# Patient Record
Sex: FEMALE | Race: AMERICAN INDIAN OR ALASKA NATIVE | NOT HISPANIC OR LATINO | Employment: FULL TIME | URBAN - METROPOLITAN AREA
[De-identification: names, ages, dates, MRNs, and addresses within clinical notes are randomized per-mention and may not be internally consistent; named-entity substitution may affect disease eponyms.]

---

## 2023-09-12 ENCOUNTER — MEDICAL CORRESPONDENCE (OUTPATIENT)
Dept: HEALTH INFORMATION MANAGEMENT | Facility: CLINIC | Age: 38
End: 2023-09-12

## 2023-09-12 ENCOUNTER — HOSPITAL ENCOUNTER (EMERGENCY)
Facility: CLINIC | Age: 38
Discharge: HOME | End: 2023-09-12
Attending: STUDENT IN AN ORGANIZED HEALTH CARE EDUCATION/TRAINING PROGRAM | Admitting: STUDENT IN AN ORGANIZED HEALTH CARE EDUCATION/TRAINING PROGRAM
Payer: COMMERCIAL

## 2023-09-12 VITALS
WEIGHT: 139.33 LBS | HEART RATE: 88 BPM | RESPIRATION RATE: 20 BRPM | OXYGEN SATURATION: 100 % | DIASTOLIC BLOOD PRESSURE: 79 MMHG | SYSTOLIC BLOOD PRESSURE: 122 MMHG

## 2023-09-12 DIAGNOSIS — S51.852A DOG BITE OF LEFT FOREARM, INITIAL ENCOUNTER: ICD-10-CM
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
## 2023-09-12 PROBLEM — Z23 RABIES, NEED FOR PROPHYLACTIC VACCINATION AGAINST: Status: ACTIVE | Noted: 2023-09-12

## 2023-09-12 PROCEDURE — 250N000011 HC RX IP 250 OP 636: Performed by: EMERGENCY MEDICINE

## 2023-09-12 PROCEDURE — 90471 IMMUNIZATION ADMIN: CPT | Performed by: EMERGENCY MEDICINE

## 2023-09-12 PROCEDURE — 90675 RABIES VACCINE IM: CPT | Performed by: EMERGENCY MEDICINE

## 2023-09-12 PROCEDURE — 90375 RABIES IG IM/SC: CPT | Mod: JZ | Performed by: EMERGENCY MEDICINE

## 2023-09-12 PROCEDURE — 99284 EMERGENCY DEPT VISIT MOD MDM: CPT | Mod: 25

## 2023-09-12 RX ADMIN — RABIES VIRUS STRAIN PM-1503-3M ANTIGEN (PROPIOLACTONE INACTIVATED) AND WATER 1 ML: KIT at 12:11

## 2023-09-12 RX ADMIN — RABIES IMMUNE GLOBULIN (HUMAN) 1260 UNITS: 300 INJECTION, SOLUTION INFILTRATION; INTRAMUSCULAR at 12:12

## 2023-09-12 NOTE — ED PROVIDER NOTES
History     Chief Complaint:  Dog Bite       HPI   Huyen Burgos is a 38 year old female who presents from urgent care after wound care and tetanus shot with antibiotic prescription for Augmentin to obtain rabies immunoglobulin and vaccine.  She notes last night she tried to intervene in a dog fight between her dog and a stray dog.  She notes the stray dog bit her in the left forearm.  She notes she does not know the dog and did not get custody of the dog.  She notes some soreness over the left forearm.  She went to urgent care and they bandaged and cleaned the wound as well as gave her a prescription for Augmentin and a tetanus shot.  She was sent here for rabies immunoglobulin and vaccine.  She denies numbness or tingling.  She denies any problems with movement of the left arm aside from the generalized soreness where the dog bite is located.  She incidentally notes she is on Keflex for a UTI.      Independent Historian:   None - Patient Only    Review of External Notes:   I reviewed the patient's chart.  I did not see a note regarding today's evaluation.    Medications:    Birth control  Antidepressant  Keflex    Past Medical History:    Past Medical History:   Diagnosis Date    History of colposcopy with cervical biopsy 5/12/11    HSIL on Pap smear 5/3/11         Physical Exam   Patient Vitals for the past 24 hrs:   BP Pulse Resp SpO2 Weight   09/12/23 0946 122/79 88 20 100 % --   09/12/23 0943 -- -- -- -- 63.2 kg (139 lb 5.3 oz)        Physical Exam  General: Well female sitting upright  CV: Left radial pulse palpable.  MSK: Tender at the area of the puncture wound over the left volar forearm, proximal.  No palpable foreign body.  Normal active range of motion of the left upper extremity.  Skin: Warm and dry.  Puncture wound noted over the volar surface of the left upper extremity, approximately.  No surrounding erythema.  Small amount of visible cutaneous fat.  No visible muscle.  Neuro: Alert and  oriented. Responds appropriately to all questions and commands.  Station intact to light touch over all dermatomes of the distal left upper extremity.  5 out of 5 finger abduction, thumb opposition and wrist extension left upper extremity.  Psych: Normal mood and affect. Pleasant.     Emergency Department Course       Emergency Department Course & Assessments:      Interventions:  Medications   rabies immune globulin 300 units/mL (HYPERAB) injection 1,260 Units (1,260 Units Infiltration $Given by Other Clinician 9/12/23 1212)   rabies vaccine,human diploid (IMOVAX) vaccine 1 mL (1 mL Intramuscular $Given 9/12/23 1211)        Assessments:  I performed an exam of the patient and obtained history, as documented above.  I performed a subcutaneous injection of rabies IG at the site of the wound.   Patient reassessed. No new concerns.     Independent Interpretation (X-rays, CTs, rhythm strip):  None    Consultations/Discussion of Management or Tests:  None     Social Determinants of Health affecting care:   None    Disposition:  The patient was discharged to home.     Impression & Plan        Medical Decision Making:  Huyen Burgos is a 38 year old female who presents for evaluation of a dog bite to the left forearm. She had wound care, tetanus and abx at outpatient facility. The workup here in the ED shows no signs of compartment syndrome, significant lacerations, tendon or bone injury.  The puncture wound is left open to reduce the risk of development of infection. No signs of foreign body or dog teeth in wound.  As the dog is not known or captured, the patient was recommended rabies immunoglobulin and rabies vaccine series.  She is agreeable to this plan.  Initial doses were given in the ED with vaccine series appointment set up for her.  All questions were held prior to discharge.  Infection risk discussed, patient stands importance of return should she develop any of the symptoms.    Diagnosis:    ICD-10-CM    1.  Dog bite of left forearm, initial encounter  S51.852A     W54.0XXA             9/12/2023   Noy Pearson MD Jonkman, Tracy Dianne, MD  09/12/23 1449       Noy Pearson MD  09/12/23 4470

## 2023-09-12 NOTE — ED NOTES
Monitored patient after Rabies series- no adverse reactions. Patient stable and ready for discharge

## 2023-09-12 NOTE — ED TRIAGE NOTES
Pt was bitten by a stray dog last night Was seen in  this AM. Wound was cleaned and tetanus was administered. Pt was referred here for rabies shot. ABCs intact. CMS intact.      Triage Assessment       Row Name 09/12/23 0999       Triage Assessment (Adult)    Airway WDL WDL       Respiratory WDL    Respiratory WDL WDL       Peripheral/Neurovascular WDL    Peripheral Neurovascular WDL WDL

## 2023-09-15 ENCOUNTER — HOSPITAL ENCOUNTER (OUTPATIENT)
Dept: OUTPATIENT PROCEDURES | Facility: CLINIC | Age: 38
Discharge: HOME | End: 2023-09-15
Attending: EMERGENCY MEDICINE | Admitting: EMERGENCY MEDICINE
Payer: COMMERCIAL

## 2023-09-15 VITALS
RESPIRATION RATE: 16 BRPM | SYSTOLIC BLOOD PRESSURE: 138 MMHG | TEMPERATURE: 98.7 F | DIASTOLIC BLOOD PRESSURE: 89 MMHG | HEART RATE: 109 BPM | OXYGEN SATURATION: 100 %

## 2023-09-15 DIAGNOSIS — Z23 RABIES, NEED FOR PROPHYLACTIC VACCINATION AGAINST: Primary | ICD-10-CM

## 2023-09-15 PROCEDURE — 90471 IMMUNIZATION ADMIN: CPT | Performed by: EMERGENCY MEDICINE

## 2023-09-15 PROCEDURE — 90675 RABIES VACCINE IM: CPT | Performed by: EMERGENCY MEDICINE

## 2023-09-15 PROCEDURE — 250N000011 HC RX IP 250 OP 636: Performed by: EMERGENCY MEDICINE

## 2023-09-15 RX ADMIN — Medication 1 ML: at 14:09

## 2023-09-15 NOTE — PROGRESS NOTES
Outpatient Infusion Nursing Note    Huyen Burgos present today to [facility] for: rabies injection    Due to: Rabies, need for prophylactic vaccination against    Injection Note: Huyen tolerated rabies injection in L deltoid without difficulty or adverse effects. RN scheduled next 2 appointments in [facility].    Discharge Planning: Huyen verbalized understanding of discharge instructions. RN reviewed that pt should return to [facility] on 9/19 at 1:30pm for next injection. Pt left [facility] in stable condition.

## 2023-09-19 ENCOUNTER — HOSPITAL ENCOUNTER (OUTPATIENT)
Dept: OUTPATIENT PROCEDURES | Facility: CLINIC | Age: 38
Discharge: HOME | End: 2023-09-19
Attending: RADIOLOGY | Admitting: RADIOLOGY
Payer: COMMERCIAL

## 2023-09-19 VITALS
HEART RATE: 105 BPM | SYSTOLIC BLOOD PRESSURE: 113 MMHG | RESPIRATION RATE: 18 BRPM | OXYGEN SATURATION: 97 % | DIASTOLIC BLOOD PRESSURE: 78 MMHG

## 2023-09-19 DIAGNOSIS — Z23 RABIES, NEED FOR PROPHYLACTIC VACCINATION AGAINST: Primary | ICD-10-CM

## 2023-09-19 PROCEDURE — 90471 IMMUNIZATION ADMIN: CPT | Performed by: EMERGENCY MEDICINE

## 2023-09-19 PROCEDURE — 250N000011 HC RX IP 250 OP 636: Performed by: EMERGENCY MEDICINE

## 2023-09-19 PROCEDURE — 90675 RABIES VACCINE IM: CPT | Performed by: EMERGENCY MEDICINE

## 2023-09-19 RX ADMIN — RABIES VIRUS STRAIN PM-1503-3M ANTIGEN (PROPIOLACTONE INACTIVATED) AND WATER 1 ML: KIT at 13:53

## 2023-09-19 NOTE — PROGRESS NOTES
Outpatient Infusion Nursing Note    Huyen Burgos present today to [facility] for: Rabies Vaccine- Day 7    Due to: Rabies, need for prophylactic vaccination against    Intravenous Access/ Labs: NA    Coping:  Child Family Life: declined    Infusion Note: VSS. Rabies vaccine given into Right Deltoid without issue. Next appointment is scheduled for next Tuesday.     Discharge Planning: Patient verbalized understanding of discharge instructions.  RN reviewed that pt should return Next week for day 14 vaccine.  Pt left [facility] in stable condition.

## 2023-09-26 ENCOUNTER — HOSPITAL ENCOUNTER (OUTPATIENT)
Dept: OUTPATIENT PROCEDURES | Facility: CLINIC | Age: 38
Discharge: HOME | End: 2023-09-26
Attending: EMERGENCY MEDICINE | Admitting: EMERGENCY MEDICINE
Payer: COMMERCIAL

## 2023-09-26 VITALS
RESPIRATION RATE: 16 BRPM | OXYGEN SATURATION: 97 % | TEMPERATURE: 98.5 F | HEART RATE: 90 BPM | SYSTOLIC BLOOD PRESSURE: 116 MMHG | DIASTOLIC BLOOD PRESSURE: 86 MMHG

## 2023-09-26 DIAGNOSIS — Z23 RABIES, NEED FOR PROPHYLACTIC VACCINATION AGAINST: Primary | ICD-10-CM

## 2023-09-26 PROCEDURE — 90675 RABIES VACCINE IM: CPT | Performed by: EMERGENCY MEDICINE

## 2023-09-26 PROCEDURE — 90471 IMMUNIZATION ADMIN: CPT | Performed by: EMERGENCY MEDICINE

## 2023-09-26 PROCEDURE — 250N000011 HC RX IP 250 OP 636: Performed by: EMERGENCY MEDICINE

## 2023-09-26 RX ADMIN — Medication 1 ML: at 13:48

## 2023-09-26 ASSESSMENT — PAIN SCALES - GENERAL: PAINLEVEL: NO PAIN (1)

## 2023-09-26 NOTE — PROGRESS NOTES
Outpatient Infusion Nursing Note    [facility]    Due to: Rabies, need for prophylactic vaccination against    Intravenous Access/ Labs: N/A    Coping: N/A    Infusion Note: VSS. Pt noting hard bump/swelling that is painful below site of bite, starting in the past few days. No redness noted but bump is hard on palpation. Rabies vaccine administered in R Deltoid without issue. Final appointment.    Discharge Planning: Patient verbalized understanding of discharge instructions. RN reviewed that pt does not need to come in for any more vaccines, however encouraged pt to seek medical attention for hard bump near bite and to monitor for increased pain, redness, swelling. Pt left [facility] in stable condition.

## 2024-04-29 ENCOUNTER — HOSPITAL ENCOUNTER (INPATIENT)
Facility: CLINIC | Age: 39
LOS: 2 days | Discharge: HOME | DRG: 330 | End: 2024-05-02
Attending: STUDENT IN AN ORGANIZED HEALTH CARE EDUCATION/TRAINING PROGRAM | Admitting: SURGERY
Payer: COMMERCIAL

## 2024-04-29 ENCOUNTER — APPOINTMENT (OUTPATIENT)
Dept: CT IMAGING | Facility: CLINIC | Age: 39
DRG: 330 | End: 2024-04-29
Attending: STUDENT IN AN ORGANIZED HEALTH CARE EDUCATION/TRAINING PROGRAM
Payer: COMMERCIAL

## 2024-04-29 DIAGNOSIS — R11.2 NAUSEA AND VOMITING, UNSPECIFIED VOMITING TYPE: ICD-10-CM

## 2024-04-29 DIAGNOSIS — R10.84 GENERALIZED ABDOMINAL PAIN: ICD-10-CM

## 2024-04-29 DIAGNOSIS — Z98.890 S/P EXPLORATORY LAPAROTOMY: ICD-10-CM

## 2024-04-29 DIAGNOSIS — K56.2 CECAL VOLVULUS (H): Primary | ICD-10-CM

## 2024-04-29 PROBLEM — N94.6 DYSMENORRHEA: Status: ACTIVE | Noted: 2022-07-18

## 2024-04-29 PROBLEM — N63.13 MASS OF LOWER OUTER QUADRANT OF RIGHT BREAST: Status: ACTIVE | Noted: 2022-07-18

## 2024-04-29 LAB
ALBUMIN SERPL BCG-MCNC: 4.4 G/DL (ref 3.5–5.2)
ALP SERPL-CCNC: 76 U/L (ref 40–150)
ALT SERPL W P-5'-P-CCNC: 54 U/L (ref 0–50)
ANION GAP SERPL CALCULATED.3IONS-SCNC: 15 MMOL/L (ref 7–15)
AST SERPL W P-5'-P-CCNC: 46 U/L (ref 0–45)
BASOPHILS # BLD AUTO: 0.1 10E3/UL (ref 0–0.2)
BASOPHILS NFR BLD AUTO: 1 %
BILIRUB SERPL-MCNC: 1.3 MG/DL
BUN SERPL-MCNC: 12.6 MG/DL (ref 6–20)
CALCIUM SERPL-MCNC: 9.4 MG/DL (ref 8.6–10)
CHLORIDE SERPL-SCNC: 100 MMOL/L (ref 98–107)
CREAT SERPL-MCNC: 0.81 MG/DL (ref 0.51–0.95)
DEPRECATED HCO3 PLAS-SCNC: 22 MMOL/L (ref 22–29)
EGFRCR SERPLBLD CKD-EPI 2021: >90 ML/MIN/1.73M2
EOSINOPHIL # BLD AUTO: 0 10E3/UL (ref 0–0.7)
EOSINOPHIL NFR BLD AUTO: 0 %
ERYTHROCYTE [DISTWIDTH] IN BLOOD BY AUTOMATED COUNT: 12.3 % (ref 10–15)
GLUCOSE SERPL-MCNC: 137 MG/DL (ref 70–99)
HCG SERPL QL: NEGATIVE
HCT VFR BLD AUTO: 40.4 % (ref 35–47)
HGB BLD-MCNC: 13.4 G/DL (ref 11.7–15.7)
IMM GRANULOCYTES # BLD: 0.1 10E3/UL
IMM GRANULOCYTES NFR BLD: 1 %
LACTATE SERPL-SCNC: 0.7 MMOL/L (ref 0.7–2)
LIPASE SERPL-CCNC: 15 U/L (ref 13–60)
LYMPHOCYTES # BLD AUTO: 0.5 10E3/UL (ref 0.8–5.3)
LYMPHOCYTES NFR BLD AUTO: 5 %
MAGNESIUM SERPL-MCNC: 1.9 MG/DL (ref 1.7–2.3)
MCH RBC QN AUTO: 34.5 PG (ref 26.5–33)
MCHC RBC AUTO-ENTMCNC: 33.2 G/DL (ref 31.5–36.5)
MCV RBC AUTO: 104 FL (ref 78–100)
MONOCYTES # BLD AUTO: 0.4 10E3/UL (ref 0–1.3)
MONOCYTES NFR BLD AUTO: 4 %
NEUTROPHILS # BLD AUTO: 9.3 10E3/UL (ref 1.6–8.3)
NEUTROPHILS NFR BLD AUTO: 89 %
NRBC # BLD AUTO: 0 10E3/UL
NRBC BLD AUTO-RTO: 0 /100
PLATELET # BLD AUTO: 272 10E3/UL (ref 150–450)
POTASSIUM SERPL-SCNC: 3.8 MMOL/L (ref 3.4–5.3)
PROT SERPL-MCNC: 7.3 G/DL (ref 6.4–8.3)
RBC # BLD AUTO: 3.88 10E6/UL (ref 3.8–5.2)
SODIUM SERPL-SCNC: 137 MMOL/L (ref 135–145)
WBC # BLD AUTO: 10.3 10E3/UL (ref 4–11)

## 2024-04-29 PROCEDURE — 84703 CHORIONIC GONADOTROPIN ASSAY: CPT | Performed by: EMERGENCY MEDICINE

## 2024-04-29 PROCEDURE — 250N000011 HC RX IP 250 OP 636: Performed by: STUDENT IN AN ORGANIZED HEALTH CARE EDUCATION/TRAINING PROGRAM

## 2024-04-29 PROCEDURE — 258N000003 HC RX IP 258 OP 636: Performed by: STUDENT IN AN ORGANIZED HEALTH CARE EDUCATION/TRAINING PROGRAM

## 2024-04-29 PROCEDURE — 85025 COMPLETE CBC W/AUTO DIFF WBC: CPT | Performed by: EMERGENCY MEDICINE

## 2024-04-29 PROCEDURE — 36415 COLL VENOUS BLD VENIPUNCTURE: CPT | Performed by: EMERGENCY MEDICINE

## 2024-04-29 PROCEDURE — 83605 ASSAY OF LACTIC ACID: CPT | Performed by: EMERGENCY MEDICINE

## 2024-04-29 PROCEDURE — 83690 ASSAY OF LIPASE: CPT | Performed by: EMERGENCY MEDICINE

## 2024-04-29 PROCEDURE — 96374 THER/PROPH/DIAG INJ IV PUSH: CPT

## 2024-04-29 PROCEDURE — 80053 COMPREHEN METABOLIC PANEL: CPT | Performed by: EMERGENCY MEDICINE

## 2024-04-29 PROCEDURE — 74177 CT ABD & PELVIS W/CONTRAST: CPT

## 2024-04-29 PROCEDURE — 83735 ASSAY OF MAGNESIUM: CPT | Performed by: EMERGENCY MEDICINE

## 2024-04-29 PROCEDURE — 99285 EMERGENCY DEPT VISIT HI MDM: CPT | Mod: 25

## 2024-04-29 PROCEDURE — 96375 TX/PRO/DX INJ NEW DRUG ADDON: CPT

## 2024-04-29 PROCEDURE — 250N000009 HC RX 250: Performed by: STUDENT IN AN ORGANIZED HEALTH CARE EDUCATION/TRAINING PROGRAM

## 2024-04-29 RX ORDER — IOPAMIDOL 755 MG/ML
500 INJECTION, SOLUTION INTRAVASCULAR ONCE
Status: COMPLETED | OUTPATIENT
Start: 2024-04-29 | End: 2024-04-29

## 2024-04-29 RX ORDER — ONDANSETRON 2 MG/ML
4 INJECTION INTRAMUSCULAR; INTRAVENOUS EVERY 30 MIN PRN
Status: DISCONTINUED | OUTPATIENT
Start: 2024-04-29 | End: 2024-04-30

## 2024-04-29 RX ORDER — HYDROMORPHONE HYDROCHLORIDE 1 MG/ML
0.5 INJECTION, SOLUTION INTRAMUSCULAR; INTRAVENOUS; SUBCUTANEOUS EVERY 30 MIN PRN
Status: DISCONTINUED | OUTPATIENT
Start: 2024-04-29 | End: 2024-04-30

## 2024-04-29 RX ADMIN — SODIUM CHLORIDE 1000 ML: 9 INJECTION, SOLUTION INTRAVENOUS at 23:25

## 2024-04-29 RX ADMIN — SODIUM CHLORIDE 57 ML: 9 INJECTION, SOLUTION INTRAVENOUS at 23:33

## 2024-04-29 RX ADMIN — ONDANSETRON 4 MG: 2 INJECTION INTRAMUSCULAR; INTRAVENOUS at 23:19

## 2024-04-29 RX ADMIN — HYDROMORPHONE HYDROCHLORIDE 0.5 MG: 1 INJECTION, SOLUTION INTRAMUSCULAR; INTRAVENOUS; SUBCUTANEOUS at 23:21

## 2024-04-29 RX ADMIN — IOPAMIDOL 70 ML: 755 INJECTION, SOLUTION INTRAVENOUS at 23:33

## 2024-04-29 ASSESSMENT — COLUMBIA-SUICIDE SEVERITY RATING SCALE - C-SSRS
6. HAVE YOU EVER DONE ANYTHING, STARTED TO DO ANYTHING, OR PREPARED TO DO ANYTHING TO END YOUR LIFE?: NO
2. HAVE YOU ACTUALLY HAD ANY THOUGHTS OF KILLING YOURSELF IN THE PAST MONTH?: NO
1. IN THE PAST MONTH, HAVE YOU WISHED YOU WERE DEAD OR WISHED YOU COULD GO TO SLEEP AND NOT WAKE UP?: NO

## 2024-04-29 ASSESSMENT — ACTIVITIES OF DAILY LIVING (ADL)
ADLS_ACUITY_SCORE: 35

## 2024-04-29 NOTE — LETTER
[facility]  [address]  [facility] [address]  Phone: [phone]      May 2, 2024        Huyen Burgos  [address] [facility] [address]          To whom it may concern:    RE: Huyen Burgos    Patient was seen and treated at [facility] from 4/29-5/2. Due to medical reasons it is recommended she be off work through 5/12. She has a follow up appointment on 5/9 at which time she will be reassessed and changes to this or accommodations may be requested.     Please contact me for questions or concerns.      Sincerely,      Evans Gonzales MD  [facility]  [phone]

## 2024-04-30 ENCOUNTER — ANESTHESIA (OUTPATIENT)
Dept: SURGERY | Facility: CLINIC | Age: 39
DRG: 330 | End: 2024-04-30
Payer: COMMERCIAL

## 2024-04-30 ENCOUNTER — ANESTHESIA EVENT (OUTPATIENT)
Dept: SURGERY | Facility: CLINIC | Age: 39
DRG: 330 | End: 2024-04-30
Payer: COMMERCIAL

## 2024-04-30 PROBLEM — R11.2 NAUSEA AND VOMITING, UNSPECIFIED VOMITING TYPE: Status: ACTIVE | Noted: 2024-04-30

## 2024-04-30 PROBLEM — K56.2 CECAL VOLVULUS (H): Status: ACTIVE | Noted: 2024-04-30

## 2024-04-30 PROBLEM — R10.84 GENERALIZED ABDOMINAL PAIN: Status: ACTIVE | Noted: 2024-04-30

## 2024-04-30 LAB
CREAT SERPL-MCNC: 0.69 MG/DL (ref 0.51–0.95)
EGFRCR SERPLBLD CKD-EPI 2021: >90 ML/MIN/1.73M2
GLUCOSE BLDC GLUCOMTR-MCNC: 147 MG/DL (ref 70–99)
RADIOLOGIST FLAGS: ABNORMAL

## 2024-04-30 PROCEDURE — 250N000009 HC RX 250: Performed by: NURSE ANESTHETIST, CERTIFIED REGISTERED

## 2024-04-30 PROCEDURE — 250N000011 HC RX IP 250 OP 636: Performed by: ANESTHESIOLOGY

## 2024-04-30 PROCEDURE — 250N000011 HC RX IP 250 OP 636: Performed by: SURGERY

## 2024-04-30 PROCEDURE — 999N000141 HC STATISTIC PRE-PROCEDURE NURSING ASSESSMENT: Performed by: SURGERY

## 2024-04-30 PROCEDURE — 360N000077 HC SURGERY LEVEL 4, PER MIN: Performed by: SURGERY

## 2024-04-30 PROCEDURE — 120N000001 HC R&B MED SURG/OB

## 2024-04-30 PROCEDURE — 250N000011 HC RX IP 250 OP 636: Performed by: PHYSICIAN ASSISTANT

## 2024-04-30 PROCEDURE — 370N000017 HC ANESTHESIA TECHNICAL FEE, PER MIN: Performed by: SURGERY

## 2024-04-30 PROCEDURE — 258N000003 HC RX IP 258 OP 636: Performed by: SURGERY

## 2024-04-30 PROCEDURE — 44160 REMOVAL OF COLON: CPT | Mod: AS | Performed by: PHYSICIAN ASSISTANT

## 2024-04-30 PROCEDURE — 36415 COLL VENOUS BLD VENIPUNCTURE: CPT | Performed by: SURGERY

## 2024-04-30 PROCEDURE — 710N000009 HC RECOVERY PHASE 1, LEVEL 1, PER MIN: Performed by: SURGERY

## 2024-04-30 PROCEDURE — 88307 TISSUE EXAM BY PATHOLOGIST: CPT | Mod: TC | Performed by: SURGERY

## 2024-04-30 PROCEDURE — 258N000003 HC RX IP 258 OP 636: Performed by: NURSE ANESTHETIST, CERTIFIED REGISTERED

## 2024-04-30 PROCEDURE — 250N000025 HC SEVOFLURANE, PER MIN: Performed by: SURGERY

## 2024-04-30 PROCEDURE — 250N000011 HC RX IP 250 OP 636: Performed by: NURSE ANESTHETIST, CERTIFIED REGISTERED

## 2024-04-30 PROCEDURE — 272N000001 HC OR GENERAL SUPPLY STERILE: Performed by: SURGERY

## 2024-04-30 PROCEDURE — 250N000013 HC RX MED GY IP 250 OP 250 PS 637: Performed by: SURGERY

## 2024-04-30 PROCEDURE — 82565 ASSAY OF CREATININE: CPT | Performed by: SURGERY

## 2024-04-30 PROCEDURE — 88307 TISSUE EXAM BY PATHOLOGIST: CPT | Mod: 26 | Performed by: PATHOLOGY

## 2024-04-30 PROCEDURE — 44160 REMOVAL OF COLON: CPT | Performed by: SURGERY

## 2024-04-30 PROCEDURE — 0DTF0ZZ RESECTION OF RIGHT LARGE INTESTINE, OPEN APPROACH: ICD-10-PCS | Performed by: SURGERY

## 2024-04-30 PROCEDURE — 250N000013 HC RX MED GY IP 250 OP 250 PS 637: Performed by: ANESTHESIOLOGY

## 2024-04-30 RX ORDER — CEFOXITIN 1 G/1
1 INJECTION, POWDER, FOR SOLUTION INTRAVENOUS
Status: COMPLETED | OUTPATIENT
Start: 2024-04-30 | End: 2024-04-30

## 2024-04-30 RX ORDER — ONDANSETRON 2 MG/ML
4 INJECTION INTRAMUSCULAR; INTRAVENOUS EVERY 30 MIN PRN
Status: DISCONTINUED | OUTPATIENT
Start: 2024-04-30 | End: 2024-04-30

## 2024-04-30 RX ORDER — LEVONORGESTREL/ETHIN.ESTRADIOL 0.1-0.02MG
1 TABLET ORAL DAILY
COMMUNITY

## 2024-04-30 RX ORDER — ESCITALOPRAM OXALATE 10 MG/1
10 TABLET ORAL DAILY
Status: DISCONTINUED | OUTPATIENT
Start: 2024-04-30 | End: 2024-05-02 | Stop reason: HOSPADM

## 2024-04-30 RX ORDER — DEXAMETHASONE SODIUM PHOSPHATE 4 MG/ML
INJECTION, SOLUTION INTRA-ARTICULAR; INTRALESIONAL; INTRAMUSCULAR; INTRAVENOUS; SOFT TISSUE PRN
Status: DISCONTINUED | OUTPATIENT
Start: 2024-04-30 | End: 2024-04-30

## 2024-04-30 RX ORDER — KETOROLAC TROMETHAMINE 30 MG/ML
INJECTION, SOLUTION INTRAMUSCULAR; INTRAVENOUS PRN
Status: DISCONTINUED | OUTPATIENT
Start: 2024-04-30 | End: 2024-04-30

## 2024-04-30 RX ORDER — SODIUM CHLORIDE, SODIUM LACTATE, POTASSIUM CHLORIDE, CALCIUM CHLORIDE 600; 310; 30; 20 MG/100ML; MG/100ML; MG/100ML; MG/100ML
INJECTION, SOLUTION INTRAVENOUS CONTINUOUS
Status: DISCONTINUED | OUTPATIENT
Start: 2024-04-30 | End: 2024-04-30

## 2024-04-30 RX ORDER — ONDANSETRON 4 MG/1
4 TABLET, ORALLY DISINTEGRATING ORAL EVERY 6 HOURS PRN
Status: DISCONTINUED | OUTPATIENT
Start: 2024-04-30 | End: 2024-05-02 | Stop reason: HOSPADM

## 2024-04-30 RX ORDER — NALOXONE HYDROCHLORIDE 0.4 MG/ML
0.1 INJECTION, SOLUTION INTRAMUSCULAR; INTRAVENOUS; SUBCUTANEOUS
Status: DISCONTINUED | OUTPATIENT
Start: 2024-04-30 | End: 2024-04-30

## 2024-04-30 RX ORDER — OXYCODONE HYDROCHLORIDE 10 MG/1
10 TABLET ORAL EVERY 4 HOURS PRN
Status: DISCONTINUED | OUTPATIENT
Start: 2024-04-30 | End: 2024-05-02 | Stop reason: HOSPADM

## 2024-04-30 RX ORDER — ENOXAPARIN SODIUM 100 MG/ML
40 INJECTION SUBCUTANEOUS EVERY 24 HOURS
Status: DISCONTINUED | OUTPATIENT
Start: 2024-04-30 | End: 2024-05-02 | Stop reason: HOSPADM

## 2024-04-30 RX ORDER — SODIUM CHLORIDE, SODIUM LACTATE, POTASSIUM CHLORIDE, CALCIUM CHLORIDE 600; 310; 30; 20 MG/100ML; MG/100ML; MG/100ML; MG/100ML
INJECTION, SOLUTION INTRAVENOUS CONTINUOUS PRN
Status: DISCONTINUED | OUTPATIENT
Start: 2024-04-30 | End: 2024-04-30

## 2024-04-30 RX ORDER — NALOXONE HYDROCHLORIDE 0.4 MG/ML
0.1 INJECTION, SOLUTION INTRAMUSCULAR; INTRAVENOUS; SUBCUTANEOUS
Status: CANCELLED | OUTPATIENT
Start: 2024-04-30

## 2024-04-30 RX ORDER — ALBUTEROL SULFATE 0.83 MG/ML
2.5 SOLUTION RESPIRATORY (INHALATION) EVERY 4 HOURS PRN
Status: DISCONTINUED | OUTPATIENT
Start: 2024-04-30 | End: 2024-04-30

## 2024-04-30 RX ORDER — NALOXONE HYDROCHLORIDE 0.4 MG/ML
0.1 INJECTION, SOLUTION INTRAMUSCULAR; INTRAVENOUS; SUBCUTANEOUS
Status: DISCONTINUED | OUTPATIENT
Start: 2024-04-30 | End: 2024-04-30 | Stop reason: HOSPADM

## 2024-04-30 RX ORDER — ONDANSETRON 4 MG/1
4 TABLET, ORALLY DISINTEGRATING ORAL EVERY 30 MIN PRN
Status: DISCONTINUED | OUTPATIENT
Start: 2024-04-30 | End: 2024-04-30

## 2024-04-30 RX ORDER — KETOROLAC TROMETHAMINE 15 MG/ML
15 INJECTION, SOLUTION INTRAMUSCULAR; INTRAVENOUS ONCE
Status: DISCONTINUED | OUTPATIENT
Start: 2024-04-30 | End: 2024-05-01

## 2024-04-30 RX ORDER — LEVONORGESTREL/ETHIN.ESTRADIOL 0.1-0.02MG
1 TABLET ORAL DAILY
Status: DISCONTINUED | OUTPATIENT
Start: 2024-04-30 | End: 2024-05-02 | Stop reason: HOSPADM

## 2024-04-30 RX ORDER — POLYETHYLENE GLYCOL 3350 17 G/17G
17 POWDER, FOR SOLUTION ORAL DAILY
Status: DISCONTINUED | OUTPATIENT
Start: 2024-05-01 | End: 2024-05-02 | Stop reason: HOSPADM

## 2024-04-30 RX ORDER — LABETALOL HYDROCHLORIDE 5 MG/ML
10 INJECTION, SOLUTION INTRAVENOUS
Status: DISCONTINUED | OUTPATIENT
Start: 2024-04-30 | End: 2024-04-30 | Stop reason: HOSPADM

## 2024-04-30 RX ORDER — KETOROLAC TROMETHAMINE 15 MG/ML
15 INJECTION, SOLUTION INTRAMUSCULAR; INTRAVENOUS
Status: DISCONTINUED | OUTPATIENT
Start: 2024-04-30 | End: 2024-04-30 | Stop reason: HOSPADM

## 2024-04-30 RX ORDER — KETOROLAC TROMETHAMINE 15 MG/ML
15 INJECTION, SOLUTION INTRAMUSCULAR; INTRAVENOUS EVERY 6 HOURS
Qty: 4 ML | Refills: 0 | Status: COMPLETED | OUTPATIENT
Start: 2024-04-30 | End: 2024-05-01

## 2024-04-30 RX ORDER — ESCITALOPRAM OXALATE 10 MG/1
10 TABLET ORAL DAILY
COMMUNITY

## 2024-04-30 RX ORDER — LORAZEPAM 2 MG/ML
0.5 INJECTION INTRAMUSCULAR EVERY 6 HOURS PRN
Status: DISCONTINUED | OUTPATIENT
Start: 2024-04-30 | End: 2024-05-02 | Stop reason: HOSPADM

## 2024-04-30 RX ORDER — BISACODYL 10 MG
10 SUPPOSITORY, RECTAL RECTAL DAILY PRN
Status: DISCONTINUED | OUTPATIENT
Start: 2024-05-03 | End: 2024-05-02 | Stop reason: HOSPADM

## 2024-04-30 RX ORDER — ONDANSETRON 2 MG/ML
INJECTION INTRAMUSCULAR; INTRAVENOUS PRN
Status: DISCONTINUED | OUTPATIENT
Start: 2024-04-30 | End: 2024-04-30

## 2024-04-30 RX ORDER — OLANZAPINE 5 MG/1
5 TABLET, ORALLY DISINTEGRATING ORAL AT BEDTIME
Status: DISCONTINUED | OUTPATIENT
Start: 2024-04-30 | End: 2024-04-30

## 2024-04-30 RX ORDER — PRAZOSIN HYDROCHLORIDE 1 MG/1
3 CAPSULE ORAL AT BEDTIME
COMMUNITY

## 2024-04-30 RX ORDER — MAGNESIUM SULFATE HEPTAHYDRATE 40 MG/ML
2 INJECTION, SOLUTION INTRAVENOUS
Status: DISCONTINUED | OUTPATIENT
Start: 2024-04-30 | End: 2024-04-30

## 2024-04-30 RX ORDER — LIDOCAINE 40 MG/G
CREAM TOPICAL
Status: DISCONTINUED | OUTPATIENT
Start: 2024-04-30 | End: 2024-05-02 | Stop reason: HOSPADM

## 2024-04-30 RX ORDER — ONDANSETRON 2 MG/ML
4 INJECTION INTRAMUSCULAR; INTRAVENOUS EVERY 30 MIN PRN
Status: DISCONTINUED | OUTPATIENT
Start: 2024-04-30 | End: 2024-04-30 | Stop reason: HOSPADM

## 2024-04-30 RX ORDER — METHADONE HYDROCHLORIDE 10 MG/ML
2 INJECTION, SOLUTION INTRAMUSCULAR; INTRAVENOUS; SUBCUTANEOUS 3 TIMES DAILY PRN
Status: DISCONTINUED | OUTPATIENT
Start: 2024-04-30 | End: 2024-04-30 | Stop reason: HOSPADM

## 2024-04-30 RX ORDER — METHOCARBAMOL 750 MG/1
750 TABLET, FILM COATED ORAL EVERY 6 HOURS PRN
Status: DISCONTINUED | OUTPATIENT
Start: 2024-04-30 | End: 2024-05-01

## 2024-04-30 RX ORDER — ONDANSETRON 2 MG/ML
4 INJECTION INTRAMUSCULAR; INTRAVENOUS EVERY 6 HOURS PRN
Status: DISCONTINUED | OUTPATIENT
Start: 2024-04-30 | End: 2024-05-02 | Stop reason: HOSPADM

## 2024-04-30 RX ORDER — HYDRALAZINE HYDROCHLORIDE 20 MG/ML
10 INJECTION INTRAMUSCULAR; INTRAVENOUS EVERY 10 MIN PRN
Status: DISCONTINUED | OUTPATIENT
Start: 2024-04-30 | End: 2024-04-30 | Stop reason: HOSPADM

## 2024-04-30 RX ORDER — AMOXICILLIN 250 MG
1 CAPSULE ORAL 2 TIMES DAILY
Status: DISCONTINUED | OUTPATIENT
Start: 2024-04-30 | End: 2024-05-02 | Stop reason: HOSPADM

## 2024-04-30 RX ORDER — LIDOCAINE HYDROCHLORIDE 20 MG/ML
INJECTION, SOLUTION INFILTRATION; PERINEURAL PRN
Status: DISCONTINUED | OUTPATIENT
Start: 2024-04-30 | End: 2024-04-30

## 2024-04-30 RX ORDER — ACETAMINOPHEN 325 MG/1
975 TABLET ORAL EVERY 8 HOURS
Status: DISCONTINUED | OUTPATIENT
Start: 2024-04-30 | End: 2024-05-02 | Stop reason: HOSPADM

## 2024-04-30 RX ORDER — HYDROMORPHONE HCL IN WATER/PF 6 MG/30 ML
0.2 PATIENT CONTROLLED ANALGESIA SYRINGE INTRAVENOUS
Status: DISCONTINUED | OUTPATIENT
Start: 2024-04-30 | End: 2024-05-02 | Stop reason: HOSPADM

## 2024-04-30 RX ORDER — METHADONE HYDROCHLORIDE 10 MG/ML
INJECTION, SOLUTION INTRAMUSCULAR; INTRAVENOUS; SUBCUTANEOUS PRN
Status: DISCONTINUED | OUTPATIENT
Start: 2024-04-30 | End: 2024-04-30

## 2024-04-30 RX ORDER — ONDANSETRON 4 MG/1
4 TABLET, ORALLY DISINTEGRATING ORAL EVERY 30 MIN PRN
Status: DISCONTINUED | OUTPATIENT
Start: 2024-04-30 | End: 2024-04-30 | Stop reason: HOSPADM

## 2024-04-30 RX ORDER — HYDRALAZINE HYDROCHLORIDE 20 MG/ML
10 INJECTION INTRAMUSCULAR; INTRAVENOUS EVERY 10 MIN PRN
Status: DISCONTINUED | OUTPATIENT
Start: 2024-04-30 | End: 2024-04-30

## 2024-04-30 RX ORDER — LEVONORGESTREL/ETHIN.ESTRADIOL 0.1-0.02MG
1 TABLET ORAL DAILY
Status: ON HOLD | COMMUNITY
End: 2024-04-30

## 2024-04-30 RX ORDER — FENTANYL CITRATE 50 UG/ML
50 INJECTION, SOLUTION INTRAMUSCULAR; INTRAVENOUS
Status: DISCONTINUED | OUTPATIENT
Start: 2024-04-30 | End: 2024-04-30

## 2024-04-30 RX ORDER — LAMOTRIGINE 100 MG/1
200 TABLET ORAL DAILY
Status: DISCONTINUED | OUTPATIENT
Start: 2024-04-30 | End: 2024-05-02 | Stop reason: HOSPADM

## 2024-04-30 RX ORDER — PROPOFOL 10 MG/ML
INJECTION, EMULSION INTRAVENOUS PRN
Status: DISCONTINUED | OUTPATIENT
Start: 2024-04-30 | End: 2024-04-30

## 2024-04-30 RX ORDER — LABETALOL HYDROCHLORIDE 5 MG/ML
10 INJECTION, SOLUTION INTRAVENOUS
Status: DISCONTINUED | OUTPATIENT
Start: 2024-04-30 | End: 2024-04-30

## 2024-04-30 RX ORDER — PRAZOSIN HYDROCHLORIDE 1 MG/1
3 CAPSULE ORAL AT BEDTIME
Status: DISCONTINUED | OUTPATIENT
Start: 2024-04-30 | End: 2024-05-02 | Stop reason: HOSPADM

## 2024-04-30 RX ORDER — MAGNESIUM SULFATE HEPTAHYDRATE 40 MG/ML
2 INJECTION, SOLUTION INTRAVENOUS
Status: DISCONTINUED | OUTPATIENT
Start: 2024-04-30 | End: 2024-04-30 | Stop reason: HOSPADM

## 2024-04-30 RX ORDER — CEFAZOLIN SODIUM 1 G/3ML
INJECTION, POWDER, FOR SOLUTION INTRAMUSCULAR; INTRAVENOUS PRN
Status: DISCONTINUED | OUTPATIENT
Start: 2024-04-30 | End: 2024-04-30

## 2024-04-30 RX ORDER — FLUOXETINE 20 MG/1
20 TABLET, FILM COATED ORAL DAILY
Status: DISCONTINUED | OUTPATIENT
Start: 2024-04-30 | End: 2024-04-30

## 2024-04-30 RX ORDER — ONDANSETRON 2 MG/ML
4 INJECTION INTRAMUSCULAR; INTRAVENOUS EVERY 30 MIN PRN
Status: CANCELLED | OUTPATIENT
Start: 2024-04-30

## 2024-04-30 RX ORDER — PROCHLORPERAZINE MALEATE 10 MG
10 TABLET ORAL EVERY 6 HOURS PRN
Status: DISCONTINUED | OUTPATIENT
Start: 2024-04-30 | End: 2024-05-02 | Stop reason: HOSPADM

## 2024-04-30 RX ORDER — ONDANSETRON 4 MG/1
4 TABLET, ORALLY DISINTEGRATING ORAL EVERY 30 MIN PRN
Status: CANCELLED | OUTPATIENT
Start: 2024-04-30

## 2024-04-30 RX ORDER — ALBUTEROL SULFATE 0.83 MG/ML
2.5 SOLUTION RESPIRATORY (INHALATION) EVERY 4 HOURS PRN
Status: DISCONTINUED | OUTPATIENT
Start: 2024-04-30 | End: 2024-04-30 | Stop reason: HOSPADM

## 2024-04-30 RX ORDER — KETOROLAC TROMETHAMINE 15 MG/ML
15 INJECTION, SOLUTION INTRAMUSCULAR; INTRAVENOUS
Status: COMPLETED | OUTPATIENT
Start: 2024-04-30 | End: 2024-04-30

## 2024-04-30 RX ORDER — HYDROMORPHONE HCL IN WATER/PF 6 MG/30 ML
0.4 PATIENT CONTROLLED ANALGESIA SYRINGE INTRAVENOUS
Status: DISCONTINUED | OUTPATIENT
Start: 2024-04-30 | End: 2024-05-02 | Stop reason: HOSPADM

## 2024-04-30 RX ORDER — OXYCODONE HYDROCHLORIDE 5 MG/1
5 TABLET ORAL EVERY 4 HOURS PRN
Status: DISCONTINUED | OUTPATIENT
Start: 2024-04-30 | End: 2024-05-02 | Stop reason: HOSPADM

## 2024-04-30 RX ORDER — SODIUM CHLORIDE, SODIUM LACTATE, POTASSIUM CHLORIDE, CALCIUM CHLORIDE 600; 310; 30; 20 MG/100ML; MG/100ML; MG/100ML; MG/100ML
INJECTION, SOLUTION INTRAVENOUS CONTINUOUS
Status: DISCONTINUED | OUTPATIENT
Start: 2024-04-30 | End: 2024-04-30 | Stop reason: HOSPADM

## 2024-04-30 RX ORDER — SODIUM CHLORIDE, SODIUM LACTATE, POTASSIUM CHLORIDE, CALCIUM CHLORIDE 600; 310; 30; 20 MG/100ML; MG/100ML; MG/100ML; MG/100ML
INJECTION, SOLUTION INTRAVENOUS CONTINUOUS
Status: DISCONTINUED | OUTPATIENT
Start: 2024-04-30 | End: 2024-05-02 | Stop reason: HOSPADM

## 2024-04-30 RX ORDER — ACETAMINOPHEN 325 MG/1
650 TABLET ORAL EVERY 4 HOURS PRN
Status: DISCONTINUED | OUTPATIENT
Start: 2024-05-03 | End: 2024-05-02 | Stop reason: HOSPADM

## 2024-04-30 RX ORDER — METHADONE HYDROCHLORIDE 10 MG/ML
2 INJECTION, SOLUTION INTRAMUSCULAR; INTRAVENOUS; SUBCUTANEOUS 3 TIMES DAILY PRN
Status: DISCONTINUED | OUTPATIENT
Start: 2024-04-30 | End: 2024-04-30

## 2024-04-30 RX ORDER — FLUOXETINE 20 MG/1
20 TABLET, FILM COATED ORAL DAILY
Status: ON HOLD | COMMUNITY
End: 2024-04-30

## 2024-04-30 RX ORDER — POLYETHYLENE GLYCOL 3350 17 G/17G
17 POWDER, FOR SOLUTION ORAL DAILY
Status: DISCONTINUED | OUTPATIENT
Start: 2024-05-01 | End: 2024-04-30

## 2024-04-30 RX ORDER — LAMOTRIGINE 200 MG/1
200 TABLET ORAL DAILY
COMMUNITY

## 2024-04-30 RX ADMIN — PHENYLEPHRINE HYDROCHLORIDE 200 MCG: 10 INJECTION INTRAVENOUS at 02:38

## 2024-04-30 RX ADMIN — MIDAZOLAM 2 MG: 1 INJECTION INTRAMUSCULAR; INTRAVENOUS at 02:23

## 2024-04-30 RX ADMIN — ONDANSETRON 4 MG: 2 INJECTION INTRAMUSCULAR; INTRAVENOUS at 08:18

## 2024-04-30 RX ADMIN — ACETAMINOPHEN 975 MG: 325 TABLET, FILM COATED ORAL at 13:37

## 2024-04-30 RX ADMIN — ONDANSETRON 4 MG: 2 INJECTION INTRAMUSCULAR; INTRAVENOUS at 18:46

## 2024-04-30 RX ADMIN — METHOCARBAMOL 750 MG: 750 TABLET ORAL at 06:26

## 2024-04-30 RX ADMIN — SODIUM CHLORIDE, POTASSIUM CHLORIDE, SODIUM LACTATE AND CALCIUM CHLORIDE: 600; 310; 30; 20 INJECTION, SOLUTION INTRAVENOUS at 06:09

## 2024-04-30 RX ADMIN — CEFOXITIN SODIUM 2 G: 1 POWDER, FOR SOLUTION INTRAVENOUS at 02:57

## 2024-04-30 RX ADMIN — METHADONE HYDROCHLORIDE 2 MG: 10 INJECTION, SOLUTION INTRAMUSCULAR; INTRAVENOUS; SUBCUTANEOUS at 04:08

## 2024-04-30 RX ADMIN — PROPOFOL 40 MG: 10 INJECTION, EMULSION INTRAVENOUS at 03:31

## 2024-04-30 RX ADMIN — FENTANYL CITRATE 50 MCG: 50 INJECTION, SOLUTION INTRAMUSCULAR; INTRAVENOUS at 01:40

## 2024-04-30 RX ADMIN — KETOROLAC TROMETHAMINE 15 MG: 15 INJECTION, SOLUTION INTRAMUSCULAR; INTRAVENOUS at 04:18

## 2024-04-30 RX ADMIN — ENOXAPARIN SODIUM 40 MG: 40 INJECTION SUBCUTANEOUS at 22:03

## 2024-04-30 RX ADMIN — PHENYLEPHRINE HYDROCHLORIDE 200 MCG: 10 INJECTION INTRAVENOUS at 03:06

## 2024-04-30 RX ADMIN — SODIUM CHLORIDE, POTASSIUM CHLORIDE, SODIUM LACTATE AND CALCIUM CHLORIDE: 600; 310; 30; 20 INJECTION, SOLUTION INTRAVENOUS at 16:01

## 2024-04-30 RX ADMIN — PROCHLORPERAZINE MALEATE 10 MG: 5 TABLET ORAL at 12:56

## 2024-04-30 RX ADMIN — ACETAMINOPHEN 975 MG: 325 TABLET, FILM COATED ORAL at 06:25

## 2024-04-30 RX ADMIN — CEFAZOLIN 2 G: 1 INJECTION, POWDER, FOR SOLUTION INTRAMUSCULAR; INTRAVENOUS at 02:29

## 2024-04-30 RX ADMIN — METHADONE HYDROCHLORIDE 20 MG: 10 INJECTION, SOLUTION INTRAMUSCULAR; INTRAVENOUS; SUBCUTANEOUS at 02:30

## 2024-04-30 RX ADMIN — ONDANSETRON 4 MG: 2 INJECTION INTRAMUSCULAR; INTRAVENOUS at 02:30

## 2024-04-30 RX ADMIN — DEXAMETHASONE SODIUM PHOSPHATE 8 MG: 4 INJECTION, SOLUTION INTRA-ARTICULAR; INTRALESIONAL; INTRAMUSCULAR; INTRAVENOUS; SOFT TISSUE at 02:30

## 2024-04-30 RX ADMIN — SUGAMMADEX 150 MG: 100 INJECTION, SOLUTION INTRAVENOUS at 03:42

## 2024-04-30 RX ADMIN — ACETAMINOPHEN 975 MG: 325 TABLET, FILM COATED ORAL at 22:01

## 2024-04-30 RX ADMIN — KETOROLAC TROMETHAMINE 15 MG: 15 INJECTION, SOLUTION INTRAMUSCULAR; INTRAVENOUS at 10:39

## 2024-04-30 RX ADMIN — KETOROLAC TROMETHAMINE 15 MG: 15 INJECTION, SOLUTION INTRAMUSCULAR; INTRAVENOUS at 22:05

## 2024-04-30 RX ADMIN — PROCHLORPERAZINE EDISYLATE 10 MG: 5 INJECTION INTRAMUSCULAR; INTRAVENOUS at 20:51

## 2024-04-30 RX ADMIN — DEXMEDETOMIDINE HYDROCHLORIDE 0.5 MCG/KG/HR: 100 INJECTION, SOLUTION INTRAVENOUS at 02:34

## 2024-04-30 RX ADMIN — PROPOFOL 160 MG: 10 INJECTION, EMULSION INTRAVENOUS at 02:30

## 2024-04-30 RX ADMIN — LORAZEPAM 0.5 MG: 2 INJECTION INTRAMUSCULAR; INTRAVENOUS at 15:09

## 2024-04-30 RX ADMIN — KETOROLAC TROMETHAMINE 15 MG: 15 INJECTION, SOLUTION INTRAMUSCULAR; INTRAVENOUS at 15:09

## 2024-04-30 RX ADMIN — Medication 1 LOZENGE: at 04:14

## 2024-04-30 RX ADMIN — SENNOSIDES AND DOCUSATE SODIUM 1 TABLET: 8.6; 5 TABLET ORAL at 08:15

## 2024-04-30 RX ADMIN — SODIUM CHLORIDE, POTASSIUM CHLORIDE, SODIUM LACTATE AND CALCIUM CHLORIDE: 600; 310; 30; 20 INJECTION, SOLUTION INTRAVENOUS at 01:57

## 2024-04-30 RX ADMIN — KETOROLAC TROMETHAMINE 15 MG: 30 INJECTION, SOLUTION INTRAMUSCULAR at 02:38

## 2024-04-30 RX ADMIN — PRAZOSIN HYDROCHLORIDE 3 MG: 1 CAPSULE ORAL at 22:56

## 2024-04-30 RX ADMIN — LIDOCAINE HYDROCHLORIDE 50 MG: 20 INJECTION, SOLUTION INFILTRATION; PERINEURAL at 02:30

## 2024-04-30 RX ADMIN — FENTANYL CITRATE 50 MCG: 50 INJECTION, SOLUTION INTRAMUSCULAR; INTRAVENOUS at 02:03

## 2024-04-30 RX ADMIN — SODIUM CHLORIDE, POTASSIUM CHLORIDE, SODIUM LACTATE AND CALCIUM CHLORIDE: 600; 310; 30; 20 INJECTION, SOLUTION INTRAVENOUS at 02:59

## 2024-04-30 RX ADMIN — ROCURONIUM BROMIDE 50 MG: 50 INJECTION, SOLUTION INTRAVENOUS at 02:30

## 2024-04-30 ASSESSMENT — ACTIVITIES OF DAILY LIVING (ADL)
ADLS_ACUITY_SCORE: 36
ADLS_ACUITY_SCORE: 36
ADLS_ACUITY_SCORE: 33
ADLS_ACUITY_SCORE: 35
ADLS_ACUITY_SCORE: 35
ADLS_ACUITY_SCORE: 33
ADLS_ACUITY_SCORE: 35
ADLS_ACUITY_SCORE: 35
ADLS_ACUITY_SCORE: 33
ADLS_ACUITY_SCORE: 35
ADLS_ACUITY_SCORE: 36
ADLS_ACUITY_SCORE: 33
ADLS_ACUITY_SCORE: 34
ADLS_ACUITY_SCORE: 35
ADLS_ACUITY_SCORE: 35
ADLS_ACUITY_SCORE: 33

## 2024-04-30 NOTE — PHARMACY-ADMISSION MEDICATION HISTORY
Pharmacist Admission Medication History    Admission medication history is complete. The information provided in this note is only as accurate as the sources available at the time of the update.    Information Source(s): Patient, Clinic records, and CareEverywhere/SureScripts via in-person    Pertinent Information: Patient was able to confirm current medications and last known administration times. Patient has not had any medications since yesterday morning 4/29/24.    Changes made to PTA medication list:  Added: All medications below were added  Deleted: Fluoxetine 20mg tablet  Changed: Prazosin: Patient has been taking 3mg daily at bedtime for PTSD    Allergies reviewed with patient and updates made in EHR: yes    Medication History Completed By: Albin Montiel [facility] 4/30/2024 1:05 PM    PTA Med List   Medication Sig Last Dose    escitalopram (LEXAPRO) 10 MG tablet Take 10 mg by mouth daily 4/29/2024 at am    lamoTRIgine (LAMICTAL) 200 MG tablet Take 200 mg by mouth daily 4/29/2024 at am    levonorgestrel-ethinyl estradiol (AVIANE) 0.1-20 MG-MCG tablet Take 1 tablet by mouth daily 4/29/2024 at am    prazosin (MINIPRESS) 1 MG capsule Take 3 mg by mouth at bedtime States taking 3 tablets at bedtime 4/28/2024 at pm

## 2024-04-30 NOTE — ED NOTES
Rounded on patient to provide support before speaking with the surgery team. Patient anxious but emotional support was provided and patient was changed into hospital gown and socks. Report given to PACU RN.

## 2024-04-30 NOTE — ED NOTES
Rounded on patient to place IV and obtain labs, patient states she is in severe pain and this feels very similar to the last SBO that she had.

## 2024-04-30 NOTE — PLAN OF CARE
"ICU End of Shift Summary.  For vital signs and complete assessments, please see documentation flowsheets.      Pertinent assessments: Admitted from PACU at 0600. Pt drowsy, A&O, tearful at times. On 1L NC. BP WDL. DTV, bladder scanned for 152, denies sensation to void. BS+ faint, hypoactive. Ice applied to abdomen. Tylenol and muscle relaxer given. Pts wallet with cards, cash and passport and lanyard with keys and car fob sent to security.     Plan (Upcoming Events): tbd  Discharge/Transfer Needs: tbd     Bedside Shift Report Completed : yes  Bedside Safety Check Completed: yes      Goal Outcome Evaluation:      Plan of Care Reviewed With: patient    Overall Patient Progress: no changeOverall Patient Progress: no change           Problem: Adult Inpatient Plan of Care  Goal: Plan of Care Review  Description: The Plan of Care Review/Shift note should be completed every shift.  The Outcome Evaluation is a brief statement about your assessment that the patient is improving, declining, or no change.  This information will be displayed automatically on your shift  note.  Outcome: Progressing  Flowsheets (Taken 4/30/2024 0704)  Plan of Care Reviewed With: patient  Overall Patient Progress: no change  Goal: Patient-Specific Goal (Individualized)  Description: You can add care plan individualizations to a care plan. Examples of Individualization might be:  \"Parent requests to be called daily at 9am for status\", \"I have a hard time hearing out of my right ear\", or \"Do not touch me to wake me up as it startles  me\".  Outcome: Progressing  Goal: Absence of Hospital-Acquired Illness or Injury  Outcome: Progressing  Intervention: Identify and Manage Fall Risk  Recent Flowsheet Documentation  Taken 4/30/2024 0600 by Mara Karimi RN  Safety Promotion/Fall Prevention:   activity supervised   clutter free environment maintained   increased rounding and observation   increase visualization of patient   lighting adjusted   room " near nurse's station   room organization consistent   safety round/check completed   treat reversible contributory factors   treat underlying cause  Intervention: Prevent Skin Injury  Recent Flowsheet Documentation  Taken 4/30/2024 0600 by Mara Karimi RN  Body Position:   weight shifting   turned   heels elevated   legs elevated  Skin Protection: adhesive use limited  Device Skin Pressure Protection:   adhesive use limited   tubing/devices free from skin contact  Intervention: Prevent and Manage VTE (Venous Thromboembolism) Risk  Recent Flowsheet Documentation  Taken 4/30/2024 0600 by Mara Karimi RN  VTE Prevention/Management: SCDs (sequential compression devices) on  Goal: Optimal Comfort and Wellbeing  Outcome: Progressing  Intervention: Provide Person-Centered Care  Recent Flowsheet Documentation  Taken 4/30/2024 0600 by Mara Karimi RN  Trust Relationship/Rapport:   care explained   emotional support provided   empathic listening provided   questions answered   reassurance provided   thoughts/feelings acknowledged  Goal: Readiness for Transition of Care  Outcome: Progressing

## 2024-04-30 NOTE — ED PROVIDER NOTES
"History   Chief Complaint:  Abdominal pain    HPI:  Huyen Burgos is a very pleasant 39 year old female with IBS presenting with abdominal pain. For the past day, she has had generalized abdominal pain along with nausea and vomiting. Another symptom mentioned was some chills, but no fever.  Patient states she has abnormal bowel movements at baseline, usually watery.  Her last bowel movement was yesterday. She currently follow with [facility] for her IBS. She denies vaginal bleeding or urinary symptoms.  Patient last ate at lunchtime yesterday.  She last had oral intake shoulder before arrival approximately 8 PM, when she had some water.  Patient was also diagnosed with internal hemorrhoids after colonoscopy.  She has been dealing with abnormal GI symptoms for about a year.     Independent Historian:  None. Only the patient provided history.    Review of External Notes:  I personally reviewed notes from the patient's emergency department visit  dated  11/27/2023 . This provided me with information regarding patient's baseline medical problems.     I personally reviewed the patient's chart, including available medication list and available past medical history, past surgical history, family history, and social history.    Physical Exam   Patient Vitals for the past 24 hrs:   BP Temp Temp src Pulse Resp SpO2 Height Weight   04/29/24 2319 -- -- -- -- -- 100 % -- --   04/29/24 2129 110/86 -- -- 70 -- 97 % -- --   04/29/24 2119 126/87 -- -- -- -- 92 % -- --   04/29/24 2059 112/85 -- -- 65 -- 100 % -- --   04/29/24 2049 123/87 -- -- -- -- 100 % -- --   04/29/24 2030 123/83 -- -- 61 -- 96 % -- --   04/29/24 2018 125/80 98.6  F (37  C) Oral 70 -- 99 % -- --   04/29/24 2000 (!) 138/98 98.7  F (37.1  C) Oral 84 18 100 % 1.727 m (5' 8\") 63 kg (139 lb)      Physical Exam  Vitals and nursing note reviewed.   Constitutional:       General: She is in acute distress.      Appearance: Normal appearance. She is not ill-appearing or " diaphoretic.   Cardiovascular:      Rate and Rhythm: Normal rate and regular rhythm.   Abdominal:      General: Abdomen is flat. There is no distension.      Palpations: There is no mass.      Tenderness: There is abdominal tenderness (Generalized). There is no guarding or rebound.   Skin:     General: Skin is warm and dry.      Coloration: Skin is not jaundiced or pale.   Neurological:      Mental Status: She is alert and oriented to person, place, and time.            Emergency Department Course     ECG:   No ECG performed.   My interpretation     Imaging:   CT Abdomen Pelvis w Contrast   Final Result   Abnormal   IMPRESSION:    1.  Marked dilatation of the cecum with swirling of the mesentery in displacement of the cecum and terminal ileum into the right upper quadrant/midabdomen (image 28, series 4) with swirling of the mesentery in the right midabdomen. Consistent with cecal    volvulus. There is no significant bowel wall thickening at this time.         [Urgent Result: Findings concerning for cecal volvulus]      Finding was identified on 4/29/2024 11:58 PM CDT.        EVAN De La Vega was contacted by me on 4/30/2024 12:12 AM CDT and verbalized understanding of the critical result.          Report(s) per radiology.      Laboratory:   Labs Ordered and Resulted from Time of ED Arrival to Time of ED Departure   COMPREHENSIVE METABOLIC PANEL - Abnormal       Result Value    Sodium 137      Potassium 3.8      Carbon Dioxide (CO2) 22      Anion Gap 15      Urea Nitrogen 12.6      Creatinine 0.81      GFR Estimate >90      Calcium 9.4      Chloride 100      Glucose 137 (*)     Alkaline Phosphatase 76      AST 46 (*)     ALT 54 (*)     Protein Total 7.3      Albumin 4.4      Bilirubin Total 1.3 (*)    CBC WITH PLATELETS AND DIFFERENTIAL - Abnormal    WBC Count 10.3      RBC Count 3.88      Hemoglobin 13.4      Hematocrit 40.4       (*)     MCH 34.5 (*)     MCHC 33.2      RDW 12.3      Platelet Count 272       % Neutrophils 89      % Lymphocytes 5      % Monocytes 4      % Eosinophils 0      % Basophils 1      % Immature Granulocytes 1      NRBCs per 100 WBC 0      Absolute Neutrophils 9.3 (*)     Absolute Lymphocytes 0.5 (*)     Absolute Monocytes 0.4      Absolute Eosinophils 0.0      Absolute Basophils 0.1      Absolute Immature Granulocytes 0.1      Absolute NRBCs 0.0     LIPASE - Normal    Lipase 15     LACTIC ACID WHOLE BLOOD - Normal    Lactic Acid 0.7     HCG QUALITATIVE PREGNANCY - Normal    hCG Serum Qualitative Negative     MAGNESIUM - Normal    Magnesium 1.9     ROUTINE UA WITH MICROSCOPIC REFLEX TO CULTURE        Interventions & Assessments:  Interventions:  Medications   ondansetron (ZOFRAN) injection 4 mg (4 mg Intravenous $Given 4/29/24 2319)   HYDROmorphone (PF) (DILAUDID) injection 0.5 mg (0.5 mg Intravenous $Given 4/29/24 2321)   sodium chloride 0.9% BOLUS 1,000 mL (1,000 mLs Intravenous $New Bag 4/29/24 2325)   iopamidol (ISOVUE-370) solution 500 mL (70 mLs Intravenous $Given 4/29/24 2333)   CT scan flush (57 mLs Intravenous $Given 4/29/24 2333)      Assessments:  Consultations/Discussion of Management or Tests:  Independent Interpretation (X-rays, CT Head, rhythm strip):  ED Course as of 04/30/24 0059   Mon Apr 29, 2024   2303 I obtained history and examined the patient as noted above.   Tue Apr 30, 2024   0016 I spoke with Radiology regarding findings.    0020 I rechecked the patient and explained findings. I discussed plan for admission to the hospital.   0025 I spoke with Dr. Evans Gonzales from General Surgery, who accepts the patient to the OR.   0059 Patient will go directly to OR     Social Determinants of Health affecting care:   None.     Disposition:  The patient was transferred to the OR under the care of Dr. Gonzales    Impression & Plan      MIPS (If applicable):  N/A    Medical Decision Making:  Patient presenting with generalized abdominal pain, nausea and vomiting.  Vital signs are  reassuring upon arrival.  Considered differential including pregnancy, ectopic, pancreatitis, gallbladder or hepatic pathology, gastroenteritis, appendicitis, among others.  Labs here are reassuring but due to patient's persistent and severe pain, did obtain CT, which showed cecal volvulus.  There is no evidence of bowel wall thickening and patient does not have an elevated anion gap or evidence of acidosis at this time, making significant ischemia less likely.  Consulted with general surgery and patient will go directly to the OR for definitive management.       Diagnosis:    ICD-10-CM    1. 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 Generalized abdominal pain  R10.84       3. Nausea and vomiting, unspecified vomiting type  R11.2            Scribe Disclosure:  I, Yossi Heath, am serving as a scribe at 11:09 PM on 4/29/2024 to document services personally performed by Evelio Jackson MD based on my observations and the provider's statements to me.  4/29/2024     Evelio Jackson MD  04/30/24 0111

## 2024-04-30 NOTE — OP NOTE
OPERATIVE REPORT  4/30/2024      PREOPERATIVE DIAGNOSIS: Cecal Volvulus    POSTOPERATIVE DIAGNOSIS: Cecal Volvulus    OPERATION PERFORMED:   1. Exploratory Laparotomy  2. Right colectomy.    SURGEON: Evans Gonzales M.D.    ASSISTANT: Juli Mackey PA-C  The Physician Assistant was medically necessary for their expertise in prepping, suctioning, suturing and retraction.      ANESTHESIA: General.    INDICATIONS: Huyen Burgos is a 39 year old female who presented the Emergency Room with acute abdominal pain. CT scan of the abdomen was suspicious for cecal volvulus. Given these radiologic findings and her persistent abdominal pain with obstipation, I have recommended exploratory laparotomy with possible right colectomy and possible ileostomy. The risks and benefits of this surgery have been explained in detail to the patient. She has consented to proceed with surgery.    OPERATIVE FINDINGS:  There was a single band extending from the omentum down to the RLQ. This band was easily taken down but did not appear to be involved in any type of obstructive process. The cecum was very mobile with essentially no attachments to the lateral side wall or retroperitoneum. The cecum was rotated about the ileocolic pedicle. The small bowel appeared grossly normal. The sigmoid colon was not overly redundant. A primary stapled anastomosis was performed.     PROCEDURE IN DETAIL: After informed consent was obtained, the patient was brought to the operating room and placed in the supine position on the operating room table. Sequential compression devices were placed on the lower extremities prior to induction, and general anesthesia was induced without difficulty.  Her abdomen was sterilely prepped and draped in usual fashion.    A 8 cm midline incision was made extending just above and below the umbilicus. This was carried down through subcutaneous tissue to the fascia. The abdomen was safely entered through the fascia. There were no  adhesions encountered to the anterior abdominal wall.  A small amount of serous fluid was evacuated from the abdominal cavity. The medium Lauro wound retractor was placed in the incision. The abdomen was explored. The operative findings are noted above.    The cecum was easily delivered through the midline incision due to no adhesions and no attachments to the right paracolic gutter.  The cecum was volvulized.  It was untwisted in the counter-clockwise fashion. The cecum appeared large and floppy, but was pink and viable. Due to the lack of attachments, the cecum certainly appeared capable of twisting and causing a cecal volvulus.  The omentum was taken off the proximal transverse colon using bovie electrocautery. The terminal ileum and right colon were easily delivered into the wound, and mobility was excellent. The proximal and distal points of resection were identified.  A mesenteric defect was created just below the bowel wall of the terminal ileum and the ileum was divided with a JESSIE-75 stapler.  The proximal-transverse colon was divided in a similar fashion using a reload of the JESSIE-75 stapler. The terminal ileal mesentery was taken down and divided radially using the LigaSure Impact device and the intervening colonic mesentery distal to the ileocolic vessels was taken down and divided using the LigaSure device. The ileocolic vessels were isolated close to the colon and divided using the LigaSure Impact with a triple seal technique. Hemostasis was excellent. The specimen was passed off the operative field and sent pathology.    A side to side, functional end-to-end anastomosis was created.  The terminal ileum and proximal-transverse colon were brought into close proximity and enterotomies made in the corner of each staple line.  The JESSIE-75 stapler was passed into the enterotomies and lined up for the side-to-side anastomosis.  The common enterotomy was then closed with the a running 3-0vlok suture.  A suture  of 3-0 Vicryl was placed at the crotch of the staple line to avoid tension. Both the terminal ileum and colon were pink and viable with excellent blood supply at the anastomosis. There was no tension on the anastomosis. The JESSIE staple line was oversewn in Lembert fashion using 3-0 vicryl suture. The anastomosis was returned to the abdominal cavity and the abdomen was irrigated with warm saline until the irrigation fluid returned clear.    The fascia of the small lower midline incision was closed using a  #1 Stratafix suture from either end. The incision was irrigated with saline solution, and the skin was closed using 4-0 Vicryl suture in running subcuticular fashion. Dermabond was applied to all incisions.    The patient tolerated the procedure well without complications. Estimated blood loss was 10 mL. I was present and scrubbed for the entire duration of the operation. The patient was extubated in the operating room and brought to the recovery room in stable condition.

## 2024-04-30 NOTE — ANESTHESIA PROCEDURE NOTES
Airway       Patient location during procedure: OR       Procedure Start/Stop Times: 4/30/2024 2:32 AM  Staff -        Performed By: CRNA  Consent for Airway        Urgency: elective  Indications and Patient Condition       Indications for airway management: kirsty-procedural and airway protection       Induction type:intravenous       Mask difficulty assessment: 1 - vent by mask    Final Airway Details       Final airway type: endotracheal airway       Successful airway: ETT - single and Oral  Endotracheal Airway Details        ETT size (mm): 7.0       Cuffed: yes       Successful intubation technique: direct laryngoscopy       DL Blade Type: Victor 2       Grade View of Cords: 1       Position: Right       Measured from: gums/teeth       Secured at (cm): 22       Bite block used: None    Post intubation assessment        Placement verified by: capnometry and equal breath sounds        Number of attempts at approach: 1       Number of other approaches attempted: 0       Secured with: tape       Ease of procedure: easy       Dentition: Intact    Medication(s) Administered   Medication Administration Time: 4/30/2024 2:32 AM         Patient called requesting to speak to nurse. Patient has questions about her Right shoulder. Patient has concerns about what she should be doing.  Please call patient and advise. Thank you

## 2024-04-30 NOTE — ANESTHESIA CARE TRANSFER NOTE
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    Anesthesia Type:   General     Note:    Oropharynx: spontaneously breathing  Level of Consciousness: awake  Oxygen Supplementation: face mask    Independent Airway: airway patency satisfactory and stable  Dentition: dentition unchanged  Vital Signs Stable: post-procedure vital signs reviewed and stable  Report to RN Given: handoff report given  Patient transferred to: PACU    Handoff Report: Identifed the Patient, Identified the Reponsible Provider, Reviewed the pertinent medical history, Discussed the surgical course, Reviewed Intra-OP anesthesia mangement and issues during anesthesia, Set expectations for post-procedure period and Allowed opportunity for questions and acknowledgement of understanding      Vitals:  Vitals Value Taken Time   /73 04/30/24 0346   Temp 96.8  F (36  C) 04/30/24 0346   Pulse 71 04/30/24 0348   Resp 9 04/30/24 0348   SpO2 100 % 04/30/24 0348   Vitals shown include unfiled device data.    Electronically Signed By: KADE Wu CRNA  April 30, 2024  3:49 AM

## 2024-04-30 NOTE — ANESTHESIA POSTPROCEDURE EVALUATION
Patient: Huyen Burgos    Procedure: Procedure(s):  HEMICOLECTOMY, RIGHT, OPEN       Anesthesia Type:  General    Note:  Disposition: Admission   Postop Pain Control: Uneventful            Sign Out: Well controlled pain   PONV: No   Neuro/Psych: Uneventful            Sign Out: Acceptable/Baseline neuro status   Airway/Respiratory: Uneventful            Sign Out: Acceptable/Baseline resp. status   CV/Hemodynamics: Uneventful            Sign Out: Acceptable CV status   Other NRE: NONE   DID A NON-ROUTINE EVENT OCCUR? No           Last vitals:  Vitals Value Taken Time   /83 04/30/24 0420   Temp 96.8  F (36  C) 04/30/24 0346   Pulse 71 04/30/24 0424   Resp 0 04/30/24 0424   SpO2 92 % 04/30/24 0424   Vitals shown include unfiled device data.    Electronically Signed By: Augustine Aparicio MD  April 30, 2024  4:25 AM

## 2024-04-30 NOTE — ANESTHESIA PREPROCEDURE EVALUATION
Anesthesia Pre-Procedure Evaluation    Patient: Huyen Burgos   MRN: [id number] : 1985        Procedure : Procedure(s):  HEMICOLECTOMY, RIGHT, OPEN          Past Medical History:   Diagnosis Date    History of colposcopy with cervical biopsy 11    Chelsea 1 and 2    HSIL on Pap smear 5/3/11      Past Surgical History:   Procedure Laterality Date    none        Allergies   Allergen Reactions    Morphine Hives    Codeine Nausea and Vomiting    Oxycodone Nausea and Vomiting      Social History     Tobacco Use    Smoking status: Never    Smokeless tobacco: Not on file   Substance Use Topics    Alcohol use: Yes     Comment: occ.      Wt Readings from Last 1 Encounters:   24 63 kg (139 lb)        Anesthesia Evaluation   Pt has had prior anesthetic.     No history of anesthetic complications       ROS/MED HX  ENT/Pulmonary:  - neg pulmonary ROS     Neurologic:  - neg neurologic ROS     Cardiovascular:  - neg cardiovascular ROS     METS/Exercise Tolerance:     Hematologic:  - neg hematologic  ROS     Musculoskeletal:  - neg musculoskeletal ROS     GI/Hepatic: Comment: Volvulus with SBO     (+)       Inflammatory bowel disease,             Renal/Genitourinary:  - neg Renal ROS     Endo:  - neg endo ROS     Psychiatric/Substance Use:  - neg psychiatric ROS     Infectious Disease:  - neg infectious disease ROS     Malignancy:       Other:            Physical Exam    Airway        Mallampati: II   TM distance: > 3 FB   Neck ROM: full   Mouth opening: > 3 cm    Respiratory Devices and Support         Dental       (+) Minor Abnormalities - some fillings, tiny chips      Cardiovascular          Rhythm and rate: regular and normal     Pulmonary           breath sounds clear to auscultation           OUTSIDE LABS:  CBC:   Lab Results   Component Value Date    WBC 10.3 2024    HGB 13.4 2024    HCT 40.4 2024     2024     BMP:   Lab Results   Component Value Date     2024     "POTASSIUM 3.8 04/29/2024    CHLORIDE 100 04/29/2024    CO2 22 04/29/2024    BUN 12.6 04/29/2024    CR 0.81 04/29/2024     (H) 04/29/2024     COAGS: No results found for: \"PTT\", \"INR\", \"FIBR\"  POC:   Lab Results   Component Value Date    HCGS Negative 04/29/2024     HEPATIC:   Lab Results   Component Value Date    ALBUMIN 4.4 04/29/2024    PROTTOTAL 7.3 04/29/2024    ALT 54 (H) 04/29/2024    AST 46 (H) 04/29/2024    ALKPHOS 76 04/29/2024    BILITOTAL 1.3 (H) 04/29/2024     OTHER:   Lab Results   Component Value Date    LACT 0.7 04/29/2024    IVAN 9.4 04/29/2024    MAG 1.9 04/29/2024    LIPASE 15 04/29/2024       Anesthesia Plan    ASA Status:  3, emergent    NPO Status:  ELEVATED Aspiration Risk/Unknown    Anesthesia Type: General.     - Airway: ETT   Induction: Intravenous, RSI.   Maintenance: Balanced.   Techniques and Equipment:       - Drips/Meds: Dexmed. infusion     Consents    Anesthesia Plan(s) and associated risks, benefits, and realistic alternatives discussed. Questions answered and patient/representative(s) expressed understanding.     - Discussed:     - Discussed with:  Patient      - Extended Intubation/Ventilatory Support Discussed: No.      - Patient is DNR/DNI Status: No     Use of blood products discussed: No .     Postoperative Care    Pain management: IV analgesics, Oral pain medications, Multi-modal analgesia.     - Plan for long acting post-op opioid use   PONV prophylaxis: Ondansetron (or other 5HT-3), Dexamethasone or Solumedrol     Comments:    Other Comments:   Methadone 20 mg  Dexadron 8 mg  Zofran 4 mg  Toradol 15 mg  Precedex  Sugammadex if paralytic to be used            Augustine Aparicio MD                      "

## 2024-04-30 NOTE — PROGRESS NOTES
"[facility]   General Surgery Progress Note           Assessment and Plan:   Assessment:   Cecal Volvulus   -Day of Surgery Exploratory Laparotomy, Right colectomy; Pathology: pending  -Postoperative hypoxia, supplemental oxygen; weaning as tolerated  -Afebrile      Plan:   -IV fluids: Lactated Ringer @100mL/hr  -Pain management: acetaminophen, toradol, additional available  -Prophylaxis: Enoxaparin (Lovenox) SQ, PCDs  -Diet: Clear liquids, await resolution of nausea   -Advance activity as tolerated, encouraged OOB and ambulate 3-4 times a day  -IS hourly as able, encouraged  -Dispo: 1-3 days when nausea resolved, tolerating diet  Seen and agree. Anticipate ileus given longstanding IBS and constipation. Encouraged her to walk when assistance is available. Keep on clears until flatus.         Interval History:   Feeling better after antiemetic, had severe nausea before that but med helped immensely.  No flatus yet.  Minimal activity yet.  Work on clear this am after nausea improved.  Possible advancement later today if nausea resolved and feeling better.  Continue use of ice packs.         Physical Exam:   Blood pressure 107/76, pulse 64, temperature 98.1  F (36.7  C), temperature source Axillary, resp. rate 12, height 1.727 m (5' 8\"), weight 66.5 kg (146 lb 9.7 oz), last menstrual period 04/24/2024, SpO2 99%, not currently breastfeeding.  I/O last 3 completed shifts:  In: 1500 [I.V.:1500]  Out: -   Abdomen:   Soft, ice pack to be changed out  Midline Incision - clean, dry, skin glue intact              Data:   Pathology: pending    Recent Labs   Lab 04/29/24  2151   WBC 10.3   HGB 13.4   HCT 40.4   *        Recent Labs   Lab 04/30/24  0640 04/30/24  0608 04/29/24  2151   NA  --   --  137   POTASSIUM  --   --  3.8   CHLORIDE  --   --  100   CO2  --   --  22   ANIONGAP  --   --  15   GLC  --  147* 137*   BUN  --   --  12.6   CR 0.69  --  0.81   GFRESTIMATED >90  --  >90   IVAN  --   --  9.4 "   MAG  --   --  1.9   PROTTOTAL  --   --  7.3   ALBUMIN  --   --  4.4   BILITOTAL  --   --  1.3*   ALKPHOS  --   --  76   AST  --   --  46*   ALT  --   --  54*       DEMAR Berg or Text page: [phone], 8 am to 4 pm  After 4 pm, call [phone]

## 2024-04-30 NOTE — ED TRIAGE NOTES
Pt presents for evaluation of severe constipation. Hx of IBS. Took a suppository and Pepto-bismol PTA. Pain so intense, pt has had a hard time breathing and walking. Pt also c/o nausea and vomiting. Hx of bowel obstruction. Last BM was yesterday, but stool is watery.

## 2024-05-01 LAB
ALBUMIN UR-MCNC: 50 MG/DL
APPEARANCE UR: CLEAR
BILIRUB UR QL STRIP: NEGATIVE
COLOR UR AUTO: ABNORMAL
GLUCOSE BLDC GLUCOMTR-MCNC: 106 MG/DL (ref 70–99)
GLUCOSE UR STRIP-MCNC: 70 MG/DL
HGB UR QL STRIP: NEGATIVE
KETONES UR STRIP-MCNC: 60 MG/DL
LEUKOCYTE ESTERASE UR QL STRIP: NEGATIVE
MUCOUS THREADS #/AREA URNS LPF: PRESENT /LPF
NITRATE UR QL: NEGATIVE
PH UR STRIP: 6.5 [PH] (ref 5–7)
RBC URINE: 1 /HPF
SP GR UR STRIP: 1.03 (ref 1–1.03)
SQUAMOUS EPITHELIAL: 6 /HPF
UROBILINOGEN UR STRIP-MCNC: NORMAL MG/DL
WBC URINE: 1 /HPF

## 2024-05-01 PROCEDURE — 258N000003 HC RX IP 258 OP 636: Performed by: PHYSICIAN ASSISTANT

## 2024-05-01 PROCEDURE — 250N000011 HC RX IP 250 OP 636: Performed by: SURGERY

## 2024-05-01 PROCEDURE — 258N000003 HC RX IP 258 OP 636: Performed by: SURGERY

## 2024-05-01 PROCEDURE — 81001 URINALYSIS AUTO W/SCOPE: CPT | Performed by: SURGERY

## 2024-05-01 PROCEDURE — 120N000001 HC R&B MED SURG/OB

## 2024-05-01 PROCEDURE — 250N000013 HC RX MED GY IP 250 OP 250 PS 637: Performed by: PHYSICIAN ASSISTANT

## 2024-05-01 PROCEDURE — 250N000013 HC RX MED GY IP 250 OP 250 PS 637: Performed by: SURGERY

## 2024-05-01 RX ORDER — NALOXONE HYDROCHLORIDE 0.4 MG/ML
0.4 INJECTION, SOLUTION INTRAMUSCULAR; INTRAVENOUS; SUBCUTANEOUS
Status: DISCONTINUED | OUTPATIENT
Start: 2024-05-01 | End: 2024-05-02 | Stop reason: HOSPADM

## 2024-05-01 RX ORDER — NALOXONE HYDROCHLORIDE 0.4 MG/ML
0.2 INJECTION, SOLUTION INTRAMUSCULAR; INTRAVENOUS; SUBCUTANEOUS
Status: DISCONTINUED | OUTPATIENT
Start: 2024-05-01 | End: 2024-05-02 | Stop reason: HOSPADM

## 2024-05-01 RX ORDER — IBUPROFEN 600 MG/1
600 TABLET, FILM COATED ORAL EVERY 8 HOURS
Status: DISCONTINUED | OUTPATIENT
Start: 2024-05-01 | End: 2024-05-02 | Stop reason: HOSPADM

## 2024-05-01 RX ORDER — METHOCARBAMOL 500 MG/1
500 TABLET, FILM COATED ORAL 4 TIMES DAILY
Status: DISCONTINUED | OUTPATIENT
Start: 2024-05-01 | End: 2024-05-02 | Stop reason: HOSPADM

## 2024-05-01 RX ADMIN — METHOCARBAMOL 500 MG: 500 TABLET ORAL at 22:05

## 2024-05-01 RX ADMIN — ACETAMINOPHEN 975 MG: 325 TABLET, FILM COATED ORAL at 22:04

## 2024-05-01 RX ADMIN — SODIUM CHLORIDE, POTASSIUM CHLORIDE, SODIUM LACTATE AND CALCIUM CHLORIDE 1000 ML: 600; 310; 30; 20 INJECTION, SOLUTION INTRAVENOUS at 10:17

## 2024-05-01 RX ADMIN — METHOCARBAMOL 500 MG: 500 TABLET ORAL at 10:14

## 2024-05-01 RX ADMIN — PRAZOSIN HYDROCHLORIDE 3 MG: 1 CAPSULE ORAL at 22:05

## 2024-05-01 RX ADMIN — IBUPROFEN 600 MG: 600 TABLET, FILM COATED ORAL at 10:14

## 2024-05-01 RX ADMIN — Medication 1 TABLET: at 11:25

## 2024-05-01 RX ADMIN — ESCITALOPRAM OXALATE 10 MG: 10 TABLET ORAL at 09:40

## 2024-05-01 RX ADMIN — LAMOTRIGINE 200 MG: 100 TABLET ORAL at 08:51

## 2024-05-01 RX ADMIN — Medication 1 LOZENGE: at 10:22

## 2024-05-01 RX ADMIN — METHOCARBAMOL 500 MG: 500 TABLET ORAL at 14:18

## 2024-05-01 RX ADMIN — METHOCARBAMOL 500 MG: 500 TABLET ORAL at 17:53

## 2024-05-01 RX ADMIN — SODIUM CHLORIDE, POTASSIUM CHLORIDE, SODIUM LACTATE AND CALCIUM CHLORIDE: 600; 310; 30; 20 INJECTION, SOLUTION INTRAVENOUS at 15:58

## 2024-05-01 RX ADMIN — IBUPROFEN 600 MG: 600 TABLET, FILM COATED ORAL at 17:53

## 2024-05-01 RX ADMIN — SENNOSIDES AND DOCUSATE SODIUM 1 TABLET: 8.6; 5 TABLET ORAL at 20:02

## 2024-05-01 RX ADMIN — METHOCARBAMOL 750 MG: 750 TABLET ORAL at 05:53

## 2024-05-01 RX ADMIN — SENNOSIDES AND DOCUSATE SODIUM 1 TABLET: 8.6; 5 TABLET ORAL at 08:51

## 2024-05-01 RX ADMIN — POLYETHYLENE GLYCOL 3350 17 G: 17 POWDER, FOR SOLUTION ORAL at 08:51

## 2024-05-01 RX ADMIN — ACETAMINOPHEN 975 MG: 325 TABLET, FILM COATED ORAL at 05:53

## 2024-05-01 RX ADMIN — ACETAMINOPHEN 975 MG: 325 TABLET, FILM COATED ORAL at 14:18

## 2024-05-01 RX ADMIN — SODIUM CHLORIDE, POTASSIUM CHLORIDE, SODIUM LACTATE AND CALCIUM CHLORIDE: 600; 310; 30; 20 INJECTION, SOLUTION INTRAVENOUS at 02:11

## 2024-05-01 RX ADMIN — ENOXAPARIN SODIUM 40 MG: 40 INJECTION SUBCUTANEOUS at 22:05

## 2024-05-01 RX ADMIN — KETOROLAC TROMETHAMINE 15 MG: 15 INJECTION, SOLUTION INTRAMUSCULAR; INTRAVENOUS at 04:14

## 2024-05-01 ASSESSMENT — ACTIVITIES OF DAILY LIVING (ADL)
ADLS_ACUITY_SCORE: 25
ADLS_ACUITY_SCORE: 23
ADLS_ACUITY_SCORE: 23
ADLS_ACUITY_SCORE: 29
ADLS_ACUITY_SCORE: 25
ADLS_ACUITY_SCORE: 23
ADLS_ACUITY_SCORE: 27
ADLS_ACUITY_SCORE: 25
ADLS_ACUITY_SCORE: 27
ADLS_ACUITY_SCORE: 25
ADLS_ACUITY_SCORE: 25
ADLS_ACUITY_SCORE: 29
ADLS_ACUITY_SCORE: 27
ADLS_ACUITY_SCORE: 27
ADLS_ACUITY_SCORE: 25
ADLS_ACUITY_SCORE: 29
ADLS_ACUITY_SCORE: 25

## 2024-05-01 NOTE — PLAN OF CARE
"Goal Outcome Evaluation:      Plan of Care Reviewed With: patient    Overall Patient Progress: improving  Overall Patient Progress: improving    Outcome Evaluation: Pt A/O, up with SBA. POD1 from colectomy. Midline incision AMALIA with glue, ice applied. BPs soft, 1L bolus given. Some dizziness and nuasea for walk #1 this AM but imrpoved after bolus for walk #2. Showered. Not passing gas or BM, hypoactive bowels. Pain controlled with tylenol, iburpofen, and robaxin.  Pt upgraded to clear liquid diet and has been tolerating, no N/V. Having gas pains. LR at 100.     Problem: Adult Inpatient Plan of Care  Goal: Plan of Care Review  Description: The Plan of Care Review/Shift note should be completed every shift.  The Outcome Evaluation is a brief statement about your assessment that the patient is improving, declining, or no change.  This information will be displayed automatically on your shift  note.  Outcome: Progressing  Flowsheets (Taken 5/1/2024 1239)  Outcome Evaluation: Pt A/O, up with SBA. POD1 from colectomy. Midline incision AMALIA with glue, ice applied. BPs soft, 1L bolus given. Some dizziness and nuasea for walk #1 this AM but imrpoved after bolus for walk #2. Showered. Not passing gas or BM, hypoactive bowels. Pain controlled with tylenol, iburpofen, and robaxin.  Plan of Care Reviewed With: patient  Overall Patient Progress: improving  Goal: Patient-Specific Goal (Individualized)  Description: You can add care plan individualizations to a care plan. Examples of Individualization might be:  \"Parent requests to be called daily at 9am for status\", \"I have a hard time hearing out of my right ear\", or \"Do not touch me to wake me up as it startles  me\".  Outcome: Progressing  Goal: Absence of Hospital-Acquired Illness or Injury  Outcome: Progressing  Intervention: Identify and Manage Fall Risk  Recent Flowsheet Documentation  Taken 5/1/2024 0900 by Margy Rand RN  Safety Promotion/Fall Prevention:   activity " supervised   assistive device/personal items within reach   clutter free environment maintained   nonskid shoes/slippers when out of bed   patient and family education   room organization consistent   safety round/check completed   treat reversible contributory factors   treat underlying cause  Intervention: Prevent and Manage VTE (Venous Thromboembolism) Risk  Recent Flowsheet Documentation  Taken 5/1/2024 0900 by Margy Rand RN  VTE Prevention/Management: SCDs (sequential compression devices) on  Intervention: Prevent Infection  Recent Flowsheet Documentation  Taken 5/1/2024 0900 by Margy Rand RN  Infection Prevention:   hand hygiene promoted   single patient room provided  Goal: Optimal Comfort and Wellbeing  Outcome: Progressing  Intervention: Monitor Pain and Promote Comfort  Recent Flowsheet Documentation  Taken 5/1/2024 1100 by Margy Rand RN  Pain Management Interventions: rest  Taken 5/1/2024 1014 by Margy Rand RN  Pain Management Interventions: medication (see MAR)  Taken 5/1/2024 0900 by Margy Rand RN  Pain Management Interventions: cold applied  Goal: Readiness for Transition of Care  Outcome: Progressing     Problem: Infection  Goal: Absence of Infection Signs and Symptoms  Outcome: Progressing     Problem: Pain Acute  Goal: Optimal Pain Control and Function  Outcome: Progressing  Intervention: Develop Pain Management Plan  Recent Flowsheet Documentation  Taken 5/1/2024 1100 by Margy Rand RN  Pain Management Interventions: rest  Taken 5/1/2024 1014 by Margy Rand RN  Pain Management Interventions: medication (see MAR)  Taken 5/1/2024 0900 by Margy Rand RN  Pain Management Interventions: cold applied  Intervention: Prevent or Manage Pain  Recent Flowsheet Documentation  Taken 5/1/2024 0900 by Margy Rand RN  Bowel Elimination Promotion: ambulation promoted  Medication Review/Management: medications reviewed

## 2024-05-01 NOTE — PLAN OF CARE
"Goal Outcome Evaluation:      Plan of Care Reviewed With: patient    Overall Patient Progress: improvingOverall Patient Progress: improving    Outcome Evaluation: Pt is A & O, up sba, rates pain @ 4, Toradol, ice- pack , Tylenol given with relief, NPO, Compazine with for nausea with relief, LR @ 100 ml/hr, abd wound open to air sutures & dermabond cdi, dispo pending resolve of ileus & diet advancement.      Problem: Adult Inpatient Plan of Care  Goal: Plan of Care Review  Description: The Plan of Care Review/Shift note should be completed every shift.  The Outcome Evaluation is a brief statement about your assessment that the patient is improving, declining, or no change.  This information will be displayed automatically on your shift  note.  Outcome: Progressing  Flowsheets (Taken 5/1/2024 0438)  Outcome Evaluation: Pt is A & O, up sba, rates pain @ 4, Toradol, ice- pack , Tylenol given with relief, NPO, Compazine with for nausea with relief, LR @ 100 ml/hr, abd wound open to air sutures & dermabond cdi, dispo pending resolve of ileus & diet advancement.  Plan of Care Reviewed With: patient  Overall Patient Progress: improving  Goal: Patient-Specific Goal (Individualized)  Description: You can add care plan individualizations to a care plan. Examples of Individualization might be:  \"Parent requests to be called daily at 9am for status\", \"I have a hard time hearing out of my right ear\", or \"Do not touch me to wake me up as it startles  me\".  Outcome: Progressing  Goal: Absence of Hospital-Acquired Illness or Injury  Outcome: Progressing  Intervention: Identify and Manage Fall Risk  Recent Flowsheet Documentation  Taken 4/30/2024 2030 by Feliciano Holliday, RN  Safety Promotion/Fall Prevention:   safety round/check completed   nonskid shoes/slippers when out of bed   lighting adjusted   clutter free environment maintained   room organization consistent  Intervention: Prevent Infection  Recent Flowsheet " Documentation  Taken 4/30/2024 2030 by Feliciano Holliday RN  Infection Prevention:   hand hygiene promoted   single patient room provided  Goal: Optimal Comfort and Wellbeing  Outcome: Progressing  Intervention: Monitor Pain and Promote Comfort  Recent Flowsheet Documentation  Taken 4/30/2024 2148 by Feliciano Holliday RN  Pain Management Interventions:   medication (see MAR)   cold applied  Taken 4/30/2024 1930 by Feliciano Holilday RN  Pain Management Interventions: cold applied  Goal: Readiness for Transition of Care  Outcome: Progressing  Intervention: Mutually Develop Transition Plan  Recent Flowsheet Documentation  Taken 4/30/2024 2300 by Feliciano Holliday RN  Equipment Currently Used at Home: none     Problem: Pain Acute  Goal: Optimal Pain Control and Function  Outcome: Progressing  Intervention: Develop Pain Management Plan  Recent Flowsheet Documentation  Taken 4/30/2024 2148 by Feliciano Holliday RN  Pain Management Interventions:   medication (see MAR)   cold applied  Taken 4/30/2024 1930 by Feliciano Holliday RN  Pain Management Interventions: cold applied  Intervention: Prevent or Manage Pain  Recent Flowsheet Documentation  Taken 4/30/2024 2030 by Feliciano Holliday RN  Bowel Elimination Promotion: diet adjusted  Intervention: Optimize Psychosocial Wellbeing  Recent Flowsheet Documentation  Taken 4/30/2024 2030 by Feliciano Holliday RN  Supportive Measures: active listening utilized     Problem: Infection  Goal: Absence of Infection Signs and Symptoms  Outcome: Progressing     Problem: Fall Injury Risk  Goal: Absence of Fall and Fall-Related Injury  Outcome: Progressing  Intervention: Promote Injury-Free Environment  Recent Flowsheet Documentation  Taken 4/30/2024 2030 by Feliciano Holliday RN  Safety Promotion/Fall Prevention:   safety round/check completed   nonskid shoes/slippers when out of bed   lighting adjusted   clutter free environment maintained   room organization consistent     Problem: Skin Injury Risk  Increased  Goal: Skin Health and Integrity  Outcome: Progressing  Intervention: Plan: Nurse Driven Intervention: Moisture Management  Recent Flowsheet Documentation  Taken 4/30/2024 2000 by Feliciano Holliday RN  Moisture Interventions: Other (comment)  Bathing/Skin Care: other (see comments)  Intervention: Optimize Skin Protection  Recent Flowsheet Documentation  Taken 4/30/2024 2030 by Feliciano Holliday RN  Activity Management: activity adjusted per tolerance     Problem: Fatigue  Goal: Improved Activity Tolerance  Outcome: Progressing  Intervention: Promote Improved Energy  Recent Flowsheet Documentation  Taken 4/30/2024 2030 by Feliciano Holliday RN  Activity Management: activity adjusted per tolerance

## 2024-05-02 VITALS
HEART RATE: 97 BPM | TEMPERATURE: 98.6 F | HEIGHT: 68 IN | DIASTOLIC BLOOD PRESSURE: 71 MMHG | BODY MASS INDEX: 22.22 KG/M2 | SYSTOLIC BLOOD PRESSURE: 111 MMHG | WEIGHT: 146.61 LBS | RESPIRATION RATE: 16 BRPM | OXYGEN SATURATION: 95 %

## 2024-05-02 LAB
GLUCOSE BLDC GLUCOMTR-MCNC: 127 MG/DL (ref 70–99)
PATH REPORT.COMMENTS IMP SPEC: NORMAL
PATH REPORT.FINAL DX SPEC: NORMAL
PATH REPORT.GROSS SPEC: NORMAL
PATH REPORT.MICROSCOPIC SPEC OTHER STN: NORMAL
PATH REPORT.RELEVANT HX SPEC: NORMAL
PHOTO IMAGE: NORMAL

## 2024-05-02 PROCEDURE — 250N000013 HC RX MED GY IP 250 OP 250 PS 637: Performed by: PHYSICIAN ASSISTANT

## 2024-05-02 PROCEDURE — 258N000003 HC RX IP 258 OP 636: Performed by: SURGERY

## 2024-05-02 PROCEDURE — 250N000013 HC RX MED GY IP 250 OP 250 PS 637: Performed by: SURGERY

## 2024-05-02 RX ORDER — METHOCARBAMOL 500 MG/1
500 TABLET, FILM COATED ORAL 4 TIMES DAILY
Qty: 40 TABLET | Refills: 0 | Status: SHIPPED | OUTPATIENT
Start: 2024-05-02

## 2024-05-02 RX ORDER — IBUPROFEN 600 MG/1
600 TABLET, FILM COATED ORAL EVERY 8 HOURS
COMMUNITY
Start: 2024-05-02

## 2024-05-02 RX ORDER — POLYETHYLENE GLYCOL 3350 17 G/17G
17 POWDER, FOR SOLUTION ORAL DAILY
Qty: 510 G | Refills: 0 | Status: SHIPPED | OUTPATIENT
Start: 2024-05-02

## 2024-05-02 RX ORDER — ACETAMINOPHEN 325 MG/1
650 TABLET ORAL EVERY 4 HOURS PRN
COMMUNITY
Start: 2024-05-03

## 2024-05-02 RX ORDER — AMOXICILLIN 250 MG
1 CAPSULE ORAL 2 TIMES DAILY
Qty: 40 TABLET | Refills: 0 | Status: SHIPPED | OUTPATIENT
Start: 2024-05-02

## 2024-05-02 RX ADMIN — ACETAMINOPHEN 975 MG: 325 TABLET, FILM COATED ORAL at 06:28

## 2024-05-02 RX ADMIN — METHOCARBAMOL 500 MG: 500 TABLET ORAL at 10:17

## 2024-05-02 RX ADMIN — ESCITALOPRAM OXALATE 10 MG: 10 TABLET ORAL at 10:17

## 2024-05-02 RX ADMIN — METHOCARBAMOL 500 MG: 500 TABLET ORAL at 13:05

## 2024-05-02 RX ADMIN — ACETAMINOPHEN 975 MG: 325 TABLET, FILM COATED ORAL at 13:04

## 2024-05-02 RX ADMIN — POLYETHYLENE GLYCOL 3350 17 G: 17 POWDER, FOR SOLUTION ORAL at 10:17

## 2024-05-02 RX ADMIN — SENNOSIDES AND DOCUSATE SODIUM 1 TABLET: 8.6; 5 TABLET ORAL at 10:17

## 2024-05-02 RX ADMIN — SODIUM CHLORIDE, POTASSIUM CHLORIDE, SODIUM LACTATE AND CALCIUM CHLORIDE: 600; 310; 30; 20 INJECTION, SOLUTION INTRAVENOUS at 02:06

## 2024-05-02 RX ADMIN — Medication 1 TABLET: at 10:18

## 2024-05-02 RX ADMIN — LAMOTRIGINE 200 MG: 100 TABLET ORAL at 10:17

## 2024-05-02 RX ADMIN — IBUPROFEN 600 MG: 600 TABLET, FILM COATED ORAL at 10:17

## 2024-05-02 ASSESSMENT — ACTIVITIES OF DAILY LIVING (ADL)
ADLS_ACUITY_SCORE: 25
ADLS_ACUITY_SCORE: 24
ADLS_ACUITY_SCORE: 25

## 2024-05-02 NOTE — PLAN OF CARE
"Goal Outcome Evaluation:    Overall Patient Progress: improvingOverall Patient Progress: improving    Outcome Evaluation: VSS, pain well controlled. Walking independently, passing gas and stool. Tolerating diet. Discharging home to self care.      Problem: Adult Inpatient Plan of Care  Goal: Plan of Care Review  Description: The Plan of Care Review/Shift note should be completed every shift.  The Outcome Evaluation is a brief statement about your assessment that the patient is improving, declining, or no change.  This information will be displayed automatically on your shift  note.  Outcome: Adequate for Care Transition  Flowsheets (Taken 5/2/2024 1627)  Outcome Evaluation: VSS, pain well controlled. Walking independently, passing gas and stool. Tolerating diet. Discharging home to self care.  Overall Patient Progress: improving  Goal: Patient-Specific Goal (Individualized)  Description: You can add care plan individualizations to a care plan. Examples of Individualization might be:  \"Parent requests to be called daily at 9am for status\", \"I have a hard time hearing out of my right ear\", or \"Do not touch me to wake me up as it startles  me\".  Outcome: Adequate for Care Transition  Goal: Absence of Hospital-Acquired Illness or Injury  Outcome: Adequate for Care Transition  Intervention: Identify and Manage Fall Risk  Recent Flowsheet Documentation  Taken 5/2/2024 1000 by Nel Florence RN  Safety Promotion/Fall Prevention:   safety round/check completed   nonskid shoes/slippers when out of bed   clutter free environment maintained  Intervention: Prevent Skin Injury  Recent Flowsheet Documentation  Taken 5/2/2024 1000 by Nel Florence RN  Body Position: position changed independently  Intervention: Prevent and Manage VTE (Venous Thromboembolism) Risk  Recent Flowsheet Documentation  Taken 5/2/2024 1000 by Nel Florence RN  VTE Prevention/Management: SCDs (sequential compression devices) off  Intervention: Prevent " Infection  Recent Flowsheet Documentation  Taken 5/2/2024 1000 by Nel Florence RN  Infection Prevention:   rest/sleep promoted   hand hygiene promoted  Goal: Optimal Comfort and Wellbeing  Outcome: Adequate for Care Transition  Intervention: Monitor Pain and Promote Comfort  Recent Flowsheet Documentation  Taken 5/2/2024 1102 by Nel Florence RN  Pain Management Interventions: rest  Goal: Readiness for Transition of Care  Outcome: Adequate for Care Transition     Problem: Pain Acute  Goal: Optimal Pain Control and Function  Outcome: Adequate for Care Transition  Intervention: Develop Pain Management Plan  Recent Flowsheet Documentation  Taken 5/2/2024 1102 by Nel Florence RN  Pain Management Interventions: rest  Intervention: Prevent or Manage Pain  Recent Flowsheet Documentation  Taken 5/2/2024 1000 by Nel Florence RN  Bowel Elimination Promotion:   adequate fluid intake promoted   ambulation promoted  Medication Review/Management: medications reviewed     Problem: Infection  Goal: Absence of Infection Signs and Symptoms  Outcome: Adequate for Care Transition     Problem: Fall Injury Risk  Goal: Absence of Fall and Fall-Related Injury  Outcome: Adequate for Care Transition  Intervention: Identify and Manage Contributors  Recent Flowsheet Documentation  Taken 5/2/2024 1000 by Nel Florence RN  Medication Review/Management: medications reviewed  Intervention: Promote Injury-Free Environment  Recent Flowsheet Documentation  Taken 5/2/2024 1000 by Nel Florence RN  Safety Promotion/Fall Prevention:   safety round/check completed   nonskid shoes/slippers when out of bed   clutter free environment maintained     Problem: Skin Injury Risk Increased  Goal: Skin Health and Integrity  Outcome: Adequate for Care Transition  Intervention: Plan: Nurse Driven Intervention: Moisture Management  Recent Flowsheet Documentation  Taken 5/2/2024 1000 by Nel Florence RN  Moisture Interventions: Encourage regular  toileting  Intervention: Plan: Nurse Driven Intervention: Friction and Shear  Recent Flowsheet Documentation  Taken 5/2/2024 1000 by Nel Florence RN  Friction/Shear Interventions: HOB 30 degrees or less  Intervention: Optimize Skin Protection  Recent Flowsheet Documentation  Taken 5/2/2024 1000 by Nel Florence, RN  Activity Management:   ambulated to bathroom   back to bed  Head of Bed (HOB) Positioning: HOB at 20-30 degrees     Problem: Fatigue  Goal: Improved Activity Tolerance  Outcome: Adequate for Care Transition  Intervention: Promote Improved Energy  Recent Flowsheet Documentation  Taken 5/2/2024 1000 by Nel Florence, RN  Activity Management:   ambulated to bathroom   back to bed

## 2024-05-02 NOTE — DISCHARGE SUMMARY
[facility]    Discharge Summary  Surgery    Date of Admission:  4/29/2024  Date of Discharge:  5/2/2024  Discharging Provider:  Juli Mackey PA-C   Discharge Summary Note completed by: Juli Mackey PA-C on 5/2/2024  Date of Service: The patient was personally seen by Discharging Providers on the day of discharge.    Discharge Diagnoses   Active Problems:    Generalized abdominal pain    Cecal volvulus (H)    Nausea and vomiting, unspecified vomiting type    Cecal Volvulus; h/o longstanding IBS and constipation  -s/p 4/30 Exploratory Laparotomy, Right colectomy; Pathology: pending  -Postoperative hypoxia, supplemental oxygen; weaned/resolved prior to release  -Hypotension, orthostatic symptoms; degree of dehydration likely; resolved prior to release     Procedure/Surgery Information   Procedure(s):  HEMICOLECTOMY, RIGHT, OPEN   Surgeons and Role:     * Evnas Gonzales MD - Primary     * Juli Mackey PA-C - Assisting     Specimens: ID Type Source Tests Collected by Time Destination   1 : RIGHT COLON, APPENDIX, TERMINAL ILEUM Resection Large Intestine, Colon SURGICAL PATHOLOGY EXAM Evans Gonzales MD 4/30/2024  3:26 AM         History of Present Illness   Huyen Burgos is a 39 year old female who presented with acute abdominal pain. CT scan of the abdomen was suspicious for cecal volvulus. Given these radiologic findings and her persistent abdominal pain with obstipation, I have recommended exploratory laparotomy with possible right colectomy and possible ileostomy. The risks and benefits of this surgery have been explained in detail to the patient. She has consented to proceed with surgery.     Hospital Course   Huyen Burgos was admitted on 4/29/2024.  The following problems were addressed during her hospitalization:  Patient Active Problem List   Diagnosis    HSIL on Pap smear    SHARON II (cervical intraepithelial neoplasia II)    Vulvitis    Rabies, need  for prophylactic vaccination against    Mass of lower outer quadrant of right breast    Dysmenorrhea    Generalized abdominal pain    Cecal volvulus (H)    Nausea and vomiting, unspecified vomiting type     Qi-operative antibiotic therapy included: mefoxin.  Post-operative pain control: was via IV until able to tolerate PO intake and transitioned to PO pain meds.    Remarkable hospital course events: Initially quite nauseated and small emesis, so limited oral intake; attributed this to side effects she tends to have to narcotics and anesthesia.  Antiemetics did help.  Able to improve oral intake on POD#1 and resume low fat diet and bowel activity on POD#2.   Low BPs and lightheaded feeling on POD#1 resolved after IVF bolus.  Huyen met all criteria for release on 5/2/2024.  She was afebrile, tolerating diet, pain controlled on PO meds, ambulating well, and had return of bowel function.    Medications discontinued or adjusted during this hospitalization: see discharge med list below.    Antibiotics prescribed at discharge: None prescribed     Imaging study follow up needs:   -No studies require specific follow-up    Discharge Disposition   Discharged to home   Condition at discharge: Stable    Pending Results   Final pathology results: Pending at time of discharge  Unresulted Labs Ordered in the Past 30 Days of this Admission       No orders found from 3/30/2024 to 4/30/2024.            Primary Care Physician   Physician No Ref-Primary    Consultations This Hospital Stay   None    Discharge Orders      Reason for your hospital stay    Cecal volvulus and resultant emergency surgery.     Follow-up and recommended labs and tests     Follow up with Dr. Gonzales in 2-3 weeks at surgery office; [phone].  Call for appt.     Activity    Your activity upon discharge: no lifting greater than 20 lbs until postop appt, no strenuous exercise for 1 month.     Wound care and dressings    Instructions to care for your wound at  home: daily dressing changes, ice to area for comfort, may get incision wet in shower but do not soak or scrub, and remove skin glue in 3 weeks if it has not fallen off.     Discharge Medications   Current Discharge Medication List        START taking these medications    Details   acetaminophen (TYLENOL) 325 MG tablet Take 2 tablets (650 mg) by mouth every 4 hours as needed for other (For optimal non-opioid multimodal pain management to improve pain control.)    Associated Diagnoses: Cecal volvulus (H); S/P exploratory laparotomy      ibuprofen (ADVIL/MOTRIN) 600 MG tablet Take 1 tablet (600 mg) by mouth every 8 hours    Associated Diagnoses: Cecal volvulus (H); S/P exploratory laparotomy      methocarbamol (ROBAXIN) 500 MG tablet Take 1 tablet (500 mg) by mouth 4 times daily  Qty: 40 tablet, Refills: 0    Associated Diagnoses: Cecal volvulus (H); S/P exploratory laparotomy      polyethylene glycol (MIRALAX) 17 GM/Dose powder Take 17 g by mouth daily  Qty: 510 g, Refills: 0    Associated Diagnoses: Cecal volvulus (H); S/P exploratory laparotomy      senna-docusate (SENOKOT-S/PERICOLACE) 8.6-50 MG tablet Take 1 tablet by mouth 2 times daily  Qty: 40 tablet, Refills: 0    Associated Diagnoses: Cecal volvulus (H); S/P exploratory laparotomy           CONTINUE these medications which have NOT CHANGED    Details   escitalopram (LEXAPRO) 10 MG tablet Take 10 mg by mouth daily      lamoTRIgine (LAMICTAL) 200 MG tablet Take 200 mg by mouth daily      levonorgestrel-ethinyl estradiol (AVIANE) 0.1-20 MG-MCG tablet Take 1 tablet by mouth daily      prazosin (MINIPRESS) 1 MG capsule Take 3 mg by mouth at bedtime States taking 3 tablets at bedtime           Allergies   Allergies   Allergen Reactions    Morphine Hives    Codeine Nausea and Vomiting    Oxycodone Nausea and Vomiting     Data   Most Recent 3 CBC's:  Recent Labs   Lab Test 04/29/24  2151   WBC 10.3   HGB 13.4   *         Most Recent 3 BMP's:  Recent  Labs   Lab Test 05/02/24  0627 05/01/24  0406 04/30/24  0640 04/30/24  0608 04/29/24  2151   NA  --   --   --   --  137   POTASSIUM  --   --   --   --  3.8   CHLORIDE  --   --   --   --  100   CO2  --   --   --   --  22   BUN  --   --   --   --  12.6   CR  --   --  0.69  --  0.81   ANIONGAP  --   --   --   --  15   IVAN  --   --   --   --  9.4   * 106*  --  147* 137*     Most Recent 2 LFT's:  Recent Labs   Lab Test 04/29/24  2151   AST 46*   ALT 54*   ALKPHOS 76   BILITOTAL 1.3*       Results for orders placed or performed during the hospital encounter of 04/29/24   CT Abdomen Pelvis w Contrast     Value    Radiologist flags Findings concerning for cecal volvulus (Urgent)    Narrative    EXAM: CT ABDOMEN PELVIS W CONTRAST  LOCATION: [facility]  DATE: 4/29/2024    INDICATION: abdominal pain  COMPARISON: None.  TECHNIQUE: CT scan of the abdomen and pelvis was performed following injection of IV contrast. Multiplanar reformats were obtained. Dose reduction techniques were used.  CONTRAST: 70mL Isovue 370    FINDINGS:   LOWER CHEST: Normal.    HEPATOBILIARY: No significant mass or bile duct dilatation. No calcified gallstones.     PANCREAS: No significant mass, duct dilatation, or inflammatory change.    SPLEEN: Normal size.    ADRENAL GLANDS: No significant nodules.    KIDNEYS/BLADDER: No significant mass, stones, or hydronephrosis. There are simple or benign cysts. No follow up is needed.    BOWEL: The stomach and duodenum are normal in size and caliber. A cecum is markedly dilated and displaced into the left upper quadrant with marked dilatation of the bowel. The ileocecal valve is seen in the left upper quadrant. Remainder of the colon is   decompressed. The small bowel is normal in size and caliber.    LYMPH NODES: There is small amount of free fluid seen in the deep pelvis with moderate mesenteric stranding seen in the abdomen.    VASCULATURE: No abdominal aortic aneurysm. Swirling of  the mesentery is noted in the right mid abdomen.    PELVIC ORGANS: No pelvic masses.    MUSCULOSKELETAL: Unremarkable.      Impression    IMPRESSION:   1.  Marked dilatation of the cecum with swirling of the mesentery in displacement of the cecum and terminal ileum into the right upper quadrant/midabdomen (image 28, series 4) with swirling of the mesentery in the right midabdomen. Consistent with cecal   volvulus. There is no significant bowel wall thickening at this time.      [Urgent Result: Findings concerning for cecal volvulus]    Finding was identified on 4/29/2024 11:58 PM CDT.      EVAN De La Vega was contacted by me on 4/30/2024 12:12 AM CDT and verbalized understanding of the critical result.        Juli Mackey PA-C

## 2024-05-02 NOTE — PLAN OF CARE
"Goal Outcome Evaluation:      Plan of Care Reviewed With: patient    Overall Patient Progress: improvingOverall Patient Progress: improving    Outcome Evaluation: Pt alert and orientated. Up IND. Complained of some pain scheduled tyelonol and robaxin given. Passing gas no BM this shift. IVF infusing at 100ml/hr. clear liquid diet.    Temp: 99.3  F (37.4  C) Temp src: Oral BP: 112/68 Pulse: 103   Resp: 16 SpO2: 93 % O2 Device: None (Room air)         Problem: Adult Inpatient Plan of Care  Goal: Plan of Care Review  Description: The Plan of Care Review/Shift note should be completed every shift.  The Outcome Evaluation is a brief statement about your assessment that the patient is improving, declining, or no change.  This information will be displayed automatically on your shift  note.  Outcome: Progressing  Flowsheets (Taken 5/2/2024 0601)  Outcome Evaluation: Pt alert and orientated. Up IND. Complained of some pain scheduled tyelonol and robaxin given. Passing gas no BM this shift. IVF infusing at 100ml/hr. clear liquid diet.  Plan of Care Reviewed With: patient  Overall Patient Progress: improving  Goal: Patient-Specific Goal (Individualized)  Description: You can add care plan individualizations to a care plan. Examples of Individualization might be:  \"Parent requests to be called daily at 9am for status\", \"I have a hard time hearing out of my right ear\", or \"Do not touch me to wake me up as it startles  me\".  Outcome: Progressing  Goal: Absence of Hospital-Acquired Illness or Injury  Outcome: Progressing  Intervention: Identify and Manage Fall Risk  Recent Flowsheet Documentation  Taken 5/1/2024 2013 by Mercedes Todd, RN  Safety Promotion/Fall Prevention:   safety round/check completed   nonskid shoes/slippers when out of bed   clutter free environment maintained  Intervention: Prevent Skin Injury  Recent Flowsheet Documentation  Taken 5/1/2024 2013 by Mercedes Todd, RN  Body Position: position changed " independently  Intervention: Prevent and Manage VTE (Venous Thromboembolism) Risk  Recent Flowsheet Documentation  Taken 5/1/2024 2013 by Mercedes Todd RN  VTE Prevention/Management: SCDs (sequential compression devices) off  Intervention: Prevent Infection  Recent Flowsheet Documentation  Taken 5/1/2024 2013 by Mercedes Todd RN  Infection Prevention:   rest/sleep promoted   hand hygiene promoted  Goal: Optimal Comfort and Wellbeing  Outcome: Progressing  Goal: Readiness for Transition of Care  Outcome: Progressing     Problem: Pain Acute  Goal: Optimal Pain Control and Function  Outcome: Progressing  Intervention: Prevent or Manage Pain  Recent Flowsheet Documentation  Taken 5/1/2024 2013 by Mercedes Todd RN  Bowel Elimination Promotion:   adequate fluid intake promoted   ambulation promoted  Medication Review/Management: medications reviewed     Problem: Infection  Goal: Absence of Infection Signs and Symptoms  Outcome: Progressing     Problem: Fall Injury Risk  Goal: Absence of Fall and Fall-Related Injury  Outcome: Progressing  Intervention: Identify and Manage Contributors  Recent Flowsheet Documentation  Taken 5/1/2024 2013 by Mercedes Todd RN  Medication Review/Management: medications reviewed  Intervention: Promote Injury-Free Environment  Recent Flowsheet Documentation  Taken 5/1/2024 2013 by Mercedes Todd RN  Safety Promotion/Fall Prevention:   safety round/check completed   nonskid shoes/slippers when out of bed   clutter free environment maintained     Problem: Skin Injury Risk Increased  Goal: Skin Health and Integrity  Outcome: Progressing  Intervention: Plan: Nurse Driven Intervention: Moisture Management  Recent Flowsheet Documentation  Taken 5/1/2024 2013 by Mercedes Todd RN  Moisture Interventions: Encourage regular toileting  Intervention: Plan: Nurse Driven Intervention: Friction and Shear  Recent Flowsheet Documentation  Taken 5/1/2024 2013 by Mercedes Todd  RN  Friction/Shear Interventions: HOB 30 degrees or less  Intervention: Optimize Skin Protection  Recent Flowsheet Documentation  Taken 5/1/2024 2013 by Mercedes Todd, RN  Activity Management:   ambulated to bathroom   back to bed  Head of Bed (HOB) Positioning: HOB at 20-30 degrees     Problem: Fatigue  Goal: Improved Activity Tolerance  Outcome: Progressing  Intervention: Promote Improved Energy  Recent Flowsheet Documentation  Taken 5/1/2024 2013 by Mercedes Todd, RN  Activity Management:   ambulated to bathroom   back to bed

## 2024-05-02 NOTE — DISCHARGE INSTRUCTIONS
HOME CARE FOLLOWING ABDOMINAL SURGERY  BRYCE Huerta, ANGELITO Foy, PHILIP Gonzales, SINDY Cruz    Special instructions for Huyen Burgos:  --RETURN APPOINTMENT:  Schedule a follow-up visit with Dr Gonzales 2-3 weeks after discharge from the hospital.  Office Phone:  [phone]     INCISIONAL CARE:  Replace the bandage over your incision until all drainage stops, or if more comfortable to have in place.  If Dermabond (a type of skin glue) is present, leave in place until it wears/flakes off.     BATHING:  Avoid baths for 1 week after surgery.  Showers are okay.  You may wash your hair at any time.  Gently pat your incision dry after bathing.    ACTIVITY:  Light Activity -- you may immediately be up and about as tolerated.  Driving -- you may drive when comfortable and off narcotic pain medications.  Light Work -- resume when comfortable off pain medications.  (If you can drive, you probably can work.)  Strenuous Work/Activity -- limit lifting to 20 pounds for 4 weeks.  Then, progressively increase with time.  Active Sports (running, biking, etc.) -- cautiously resume after 6 weeks.    DISCOMFORT:  Use pain medications as prescribed by your surgeon.  Take the pain medication with some food, when possible, to minimize side effects.  Expect gradual improvement.    DIET:  Return to diet you were on before surgery, unless you are given specific diet instructions.  Drink plenty of fluids.  While taking pain medications, increase dietary fiber or add a fiber supplementation like Metamucil or Citrucel to help prevent constipation - a possible side effect of pain medications.    NAUSEA:  If nauseated from the anesthetic/pain meds; rest in bed, get up cautiously with assistance, and drink clear liquids (juice, tea, broth).     CONTACT US IF THE FOLLOWING DEVELOPS:   1. A fever that is above 101     2. If there is a large amount of drainage, bleeding, or swelling.   3. Severe pain that is not relieved by  your prescription.   4. Drainage that is thick, cloudy, yellow, green or white.   5. Any other questions not answered by  Frequently Asked Questions  sheet.      FREQUENTLY ASKED QUESTIONS:    Q:  How should my incision look?    A:  Normally your incision will appear slightly swollen with light redness directly along the incision itself as it heals.  It may feel like a bump or ridge as the healing/scarring happens, and over time (3-4 months) this bump or ridge feeling should slowly go away.  In general, clear or pink watery drainage can be normal at first as your incision heals, but should decrease over time.    Q:  How do I know if my incision is infected?  A:  Look at your incision for signs of infection, like redness around the incision spreading to surrounding skin, or drainage of cloudy or foul-smelling drainage.  If you feel warm, check your temperature to see if you are running a fever.    **If any of these things occur, please notify the nurse at our office.  We may need you to come into the office for an incision check.      Q:  How do I take care of my incision?  A:  If you have a dressing in place - Starting the day after surgery, replace the dressing 1-2 times a day until there is no further drainage from the incision.  At that time, a dressing is no longer needed.  Try to minimize tape on the skin if irritation is occurring at the tape sites.  If you have significant irritation from tape on the skin, please call the office to discuss other method of dressing your incision.    Small pieces of tape called  steri-strips  may be present directly overlying your incision; these may be removed 10 days after surgery unless otherwise specified by your surgeon.  If these tapes start to loosen at the ends, you may trim them back until they fall off or are removed.    A:  If you had  Dermabond  tissue glue used as a dressing (this causes your incision to look shiny with a clear covering over it) - This type of  dressing wears off with time and does not require more dressings over the top unless it is draining around the glue as it wears off.  Do not apply ointments or lotions over the incisions until the glue has completely worn off.    Q:  There is a piece of tape or a sticky  lead  still on my skin.  Can I remove this?  A:  Sometimes the sticky  leads  used for monitoring during surgery or for evaluation in the emergency department are not all removed while you are in the hospital.  These sometimes have a tab or metal dot on them.  You can easily remove these on your own, like taking off a band-aid.  If there is a gel substance under the  lead , simply wipe/clean it off with a washcloth or paper towel.      Q:  What can I do to minimize constipation (very hard stools, or lack of stools)?  A:  Stay well hydrated.  Increase your dietary fiber intake or take a fiber supplement -with plenty of water.  Walk around frequently.  You may consider an over-the-counter stool-softener.  Your Pharmacist can assist you with choosing one that is stocked at your pharmacy.  Constipation is also one of the most common side effects of pain medication.  If you are using pain medication, be pro-active and try to PREVENT problems with constipation by taking the steps above BEFORE constipation becomes a problem.    Q:  What do I do if I need more pain medications?  A:  Call the office to receive refills.  Be aware that certain pain meds cannot be called into a pharmacy and actually require a paper prescription.  A change may be made in your pain med as you progress thru your recovery period or if you have side effects to certain meds.    --Pain meds are NOT refilled after 5pm on weekdays, and NOT AT ALL on the weekends, so please look ahead to prevent problems.      Q:  Why am I having a hard time sleeping now that I am at home?  A:  Many medications you receive while you are in the hospital can impact your sleep for a number of days after  your surgery/hospitalization.  Decreased level of activity and naps during the day may also make sleeping at night difficult.  Try to minimize day-time naps, and get up frequently during the day to walk around your home during your recovery time.  Sleep aides may be of some help, but are not recommended for long-term use.      Q:  I am having some back discomfort.  What should I do?  A:  This may be related to certain positioning that was required for your surgery, extended periods of time in bed, or other changes in your overall activity level.  You may try ice, heat, acetaminophen, or ibuprofen to treat this temporarily.  Note that many pain medications have acetaminophen in them and would state this on the prescription bottle.  Be sure not to exceed the maximum of 4000mg per day of acetaminophen.     **If the pain you are having does not resolve, is severe, or is a flare of back pain you have had on other occasions prior to surgery, please contact your primary physician for further recommendations or for an appointment to be examined at their office.    Q:  Why am I having headaches?  A:  Headaches can be caused by many things:  caffeine withdrawal, use of pain meds, dehydration, high blood pressure, lack of sleep, over-activity/exhaustion, flare-up of usual migraine headaches.  If you feel this is related to muscle tension (a band-like feeling around the head, or a pressure at the low-back of the head) you may try ice or heat to this area.  You may need to drink more fluids (try electrolyte drink like Gatorade), rest, or take your usual migraine medications.   **If your headaches do not resolve, worsen, are accompanied by other symptoms, or if your blood pressure is high, please call your primary physician for recommendation and/or examination.    Q:  I am unable to urinate.  What do I do?  A:  A small percentage of people can have difficulty urinating initially after surgery.  This includes being able to  urinate only a very small amount at a time and feeling discomfort or pressure in the very low abdomen.  This is called  urinary retention , and is actually an urgent situation.  Proceed to your nearest Emergency department for evaluation (not an Urgent Care Center).  Sometimes the bladder does not work correctly after certain medications you receive during surgery, or related to certain procedures.  You may need to have a catheter placed until your bladder recovers.  When planning to go to an Emergency department, it may help to call the ER to let them know you are coming in for this problem after a surgery.  This may help you get in quicker to be evaluated.  **If you have symptoms of a urinary tract infection, please contact your primary physician for the proper evaluation and treatment.          If you have other questions, please call the office Monday thru Friday between 8am and 4:30pm to discuss with the nurse or physician assistant.  #[phone]    There is a surgeon ON CALL on weekday evenings and over the weekend in case of urgent need only, and may be contacted at the same number.    If you are having an emergency, call 911 or proceed to your nearest emergency department.

## 2024-05-02 NOTE — PROGRESS NOTES
"[facility]   General Surgery Progress Note           Assessment and Plan:   Assessment:   Cecal Volvulus; h/o longstanding IBS and constipation  -2 Days Post-Op Exploratory Laparotomy, Right colectomy; Pathology: pending  -Postoperative hypoxia, supplemental oxygen; weaned/resolved  -Hypotension, orthostatic symptoms; degree of dehydration likely; resolved  -Afebrile      Plan:   -IV fluids: Lactated Ringer @100mL/hr.  Saline lock when taking adequate PO.   -Pain management: acetaminophen, ibuprofen, ice packs, robaxin  -Bowel program  -Prophylaxis: Enoxaparin (Lovenox) SQ, PCDs  -Diet: ok to increase to full liquids and ADAT.  -Advance activity as tolerated, encouraged OOB and ambulate 3-4 times a day  -IS hourly as able, encouraged  -Dispo: Possible discharge later today when tolerating diet/nutrition and continued bowel function. DC Rx: robaxin, senokot, miralax.  OTC acetaminophen and ibuprofen, also continued ice packs.  Call for appt with Dr Gonzales in 2-3 weeks for postop check.  Discharge instructions in chart.   1411 Addendum: patient tolerating diet and had BM; interested in discharge.  Orders placed.  Juli Mackey PA-C        Interval History:   Feeling quite well today, combination of oral meds working well in addition to ice packs.  Passing flatus, no BM yet.  Tolerating clear liquids without nausea.  OK to advance diet intake and saline lock IV after drinking has returned to normal.  Likely home later today if continuing to do well.         Physical Exam:   Blood pressure 101/62, pulse 78, temperature 98.4  F (36.9  C), temperature source Oral, resp. rate 14, height 1.727 m (5' 8\"), weight 66.5 kg (146 lb 9.7 oz), last menstrual period 04/24/2024, SpO2 95%, not currently breastfeeding.  No intake/output data recorded.  Abdomen:   Soft, no distension  Midline Incision - clean, dry, skin glue intact, no erythema, mild bruising.             Data:   Pathology: pending    Recent Labs "   Lab 04/29/24 2151   WBC 10.3   HGB 13.4   HCT 40.4   *        Recent Labs   Lab 05/02/24  0627 05/01/24  0406 04/30/24  0640 04/30/24  0608 04/29/24 2151   NA  --   --   --   --  137   POTASSIUM  --   --   --   --  3.8   CHLORIDE  --   --   --   --  100   CO2  --   --   --   --  22   ANIONGAP  --   --   --   --  15   * 106*  --  147* 137*   BUN  --   --   --   --  12.6   CR  --   --  0.69  --  0.81   GFRESTIMATED  --   --  >90  --  >90   IVAN  --   --   --   --  9.4   MAG  --   --   --   --  1.9   PROTTOTAL  --   --   --   --  7.3   ALBUMIN  --   --   --   --  4.4   BILITOTAL  --   --   --   --  1.3*   ALKPHOS  --   --   --   --  76   AST  --   --   --   --  46*   ALT  --   --   --   --  54*       DEMAR Berg or Text page: [phone], 8 am to 4 pm  After 4 pm, call [phone]

## 2024-05-09 ENCOUNTER — OFFICE VISIT (OUTPATIENT)
Dept: SURGERY | Facility: CLINIC | Age: 39
End: 2024-05-09
Payer: COMMERCIAL

## 2024-05-09 VITALS
OXYGEN SATURATION: 98 % | BODY MASS INDEX: 22.13 KG/M2 | SYSTOLIC BLOOD PRESSURE: 98 MMHG | HEIGHT: 68 IN | WEIGHT: 146 LBS | DIASTOLIC BLOOD PRESSURE: 68 MMHG | RESPIRATION RATE: 16 BRPM | HEART RATE: 93 BPM

## 2024-05-09 DIAGNOSIS — Z09 FOLLOW-UP EXAMINATION FOLLOWING SURGERY: Primary | ICD-10-CM

## 2024-05-09 PROCEDURE — 99024 POSTOP FOLLOW-UP VISIT: CPT | Performed by: SURGERY

## 2024-05-09 NOTE — LETTER
May 9, 2024        RE:   Huyen Burgos 1985      Dear Colleague,    Thank you for referring your patient, Huyen Burgos, to [facility]. Please see a copy of my visit note below.    Post op follow up  Huyen greene 40 yo female here today s/p right hemicolectomy for cecal volvulus.   Overall doing well. Had small amount of bleeding from her incision.  Abd: soft, incision with ecchyomosis to lower part of incision, no erythema, no signs of infection.  Reassurance given  And work letter written.  Followup PRN.     Again, thank you for allowing me to participate in the care of your patient.      Sincerely,      Evans Gonzales MD

## 2024-05-09 NOTE — PROGRESS NOTES
Post op follow up  Huyen greene 38 yo female here today s/p right hemicolectomy for cecal volvulus.   Overall doing well. Had small amount of bleeding from her incision.  Abd: soft, incision with ecchyomosis to lower part of incision, no erythema, no signs of infection.  Reassurance given  And work letter written.  Followup PRN.

## 2024-05-09 NOTE — LETTER
[facility] [place] [address]  [facility] [address]  Phone: [phone]  Fax: [phone]    May 9, 2024        Huyen MOE Aubrey  [address] [facility] [address]          To whom it may concern:    RE: Huyen Carbajalilson    Patient was seen and treated today at our clinic.  Patient may return to work 5/13/2024, with a restriction of half days for the first week back. Starting 5/20/2024 she may resume full time duty.   Please contact me for questions or concerns.      Sincerely,      Evans Gonzales MD

## 2024-05-10 ENCOUNTER — TELEPHONE (OUTPATIENT)
Dept: SURGERY | Facility: CLINIC | Age: 39
End: 2024-05-10
Payer: COMMERCIAL

## 2024-05-10 NOTE — TELEPHONE ENCOUNTER
Name of caller: Patient    Reason for Call:  Pt was seen in clinic yesterday for PO,  but since then has developed shooting pain and a bulge near incision.    Surgeon:  Dr. Gonzales    Recent Surgery:  Yes.    If yes, when & what type:  4/30, OPERATION PERFORMED:   1. Exploratory Laparotomy  2. Right colectomy.      Best phone number to reach pt at is: [phone]  Ok to leave a message with medical info? Yes.    Pharmacy preferred (if calling for a refill): [facility]

## 2024-05-10 NOTE — TELEPHONE ENCOUNTER
Surgery  1. Exploratory Laparotomy  2. Right colectomy.  Surgery date: 4/30  Surgeon:     Pt sounded in NAD.  Pt reports  Shooting pains, originating on left side incision, up the incision, up torso, and then it fades, only lasts a couple seconds, maybe a couple times an hour.  Usually notices it when she is walking around house. Has not experienced the pain when she is sitting/resting. Did try a tylenol, did not notice that it changed the pain. She is aware she can try robaxin and she is icing her incision. We discussed possibly some nerve pain. This should improve as she heals.     Pt reports she just noticed a bulge protruding, just on the left side incision. No issues on the other side of abdomen. It is located right beside the incision on left abdomen, comes out about an inch. She thinks it is getting softer since she first noticed it an hour ago. She says it is about pingpong ball size, can move it around a little. She notes the area around the incision is bruised but no redness that she has noticed. Does not feel like the bulge is getting bigger, if anything she feels like it is dissipating since she has been icing it. Tender to touch. She thinks the bulge does feel a bit warmer than the other side of her abdomen. No tightness or shininess. Denies fever.  Overall feels like her swelling has improved in abdomen since surgery.    We discussed possible seroma/hematoma but to call if she notices any red flag symptoms.     No issues with incision itself. Dermabond still in place.    Bowel movements have been diarrhea-like since surgery, going consistently    We discussed that I would send this over to PA team to see if they have any further concerns. She is aware she should call if swelling/bulge worsens, has severe pain, or develops symptoms of infection.     No further questions or concerns per pt.  Alida Michelle RN on 5/10/2024 at 4:31 PM

## 2024-05-13 NOTE — TELEPHONE ENCOUNTER
"Analia Irwin,  The pain is likely incisional, maybe from the suture tugging on the muscle/nerve. Was she more active recently? The pain should go away with time as the suture absorbs. The bulging has me slightly concerned. Could be a seroma but possibly an early hernia if its \"ping pong ball\" sized. If it doesn't go away with some time I think the patient should be seen at some point at her convenience. This is not an emergency.    Claude Harris PA-C     Called pt to see how she is doing today and to update her on information from DEMAR above.    No answer  Left VM to call clinic, number provided.    Alida Michelle RN on 5/13/2024 at 9:26 AM    "

## 2024-07-06 ENCOUNTER — HEALTH MAINTENANCE LETTER (OUTPATIENT)
Age: 39
End: 2024-07-06

## 2025-07-13 ENCOUNTER — HEALTH MAINTENANCE LETTER (OUTPATIENT)
Age: 40
End: 2025-07-13

## (undated) DEVICE — ESU ELEC BLADE 2.75" COATED/INSULATED E1455

## (undated) DEVICE — DRSG GAUZE 4X4" 8044

## (undated) DEVICE — SU VICRYL 3-0 SH CR 8X18" J774

## (undated) DEVICE — GLOVE BIOGEL PI MICRO SZ 8.0 48580

## (undated) DEVICE — TUBING SUCTION 12"X1/4" N612

## (undated) DEVICE — LINEN TOWEL PACK X5 5464

## (undated) DEVICE — PACK MAJOR SBA15MAFSI

## (undated) DEVICE — NDL COUNTER 20CT 31142493

## (undated) DEVICE — STPL RELOAD LINEAR CUT 75MM TCR75

## (undated) DEVICE — SU VICRYL 3-0 SH 27" UND J416H

## (undated) DEVICE — DRAPE LAP W/ARMBOARD 29410

## (undated) DEVICE — GLOVE BIOGEL PI MICRO INDICATOR UNDERGLOVE SZ 8.0 48980

## (undated) DEVICE — SU WND CLOSURE VLOC 180 ABS 3-0 12" P-14 VLOCL0114

## (undated) DEVICE — Device

## (undated) DEVICE — GLOVE BIOGEL PI MICRO INDICATOR UNDERGLOVE SZ 7.5 48975

## (undated) DEVICE — BAG CLEAR TRASH 1.3M 39X33" P4040C

## (undated) DEVICE — SU PDS II 0 CTX 60" Z990G

## (undated) DEVICE — SOL NACL 0.9% IRRIG 1000ML BOTTLE 2F7124

## (undated) DEVICE — ESU GROUND PAD ADULT W/CORD E7507

## (undated) DEVICE — SUCTION TIP POOLE K770

## (undated) DEVICE — GLOVE BIOGEL PI MICRO INDICATOR UNDERGLOVE SZ 6.5 48965

## (undated) DEVICE — LINEN HALF SHEET 5512

## (undated) DEVICE — PREP CHLORAPREP 26ML TINTED HI-LITE ORANGE 930815

## (undated) DEVICE — SU DERMABOND ADVANCED .7ML DNX12

## (undated) DEVICE — ESU LIGASURE IMPACT OPEN SEALER/DVDR CVD LG JAW LF4418

## (undated) DEVICE — SUCTION TIP YANKAUER W/O VENT K86

## (undated) DEVICE — SU VICRYL 4-0 PS-2 18" UND J496H

## (undated) DEVICE — LINEN TOWEL PACK X10 5473

## (undated) DEVICE — STPL LINEAR CUT 75MM TLC75

## (undated) DEVICE — LINEN FULL SHEET 5511

## (undated) RX ORDER — METHADONE HYDROCHLORIDE 10 MG/ML
INJECTION, SOLUTION INTRAMUSCULAR; INTRAVENOUS; SUBCUTANEOUS
Status: DISPENSED
Start: 2024-04-30

## (undated) RX ORDER — CEFAZOLIN SODIUM/WATER 2 G/20 ML
SYRINGE (ML) INTRAVENOUS
Status: DISPENSED
Start: 2024-04-30

## (undated) RX ORDER — DEXMEDETOMIDINE HYDROCHLORIDE 4 UG/ML
INJECTION, SOLUTION INTRAVENOUS
Status: DISPENSED
Start: 2024-04-30

## (undated) RX ORDER — FENTANYL CITRATE-0.9 % NACL/PF 10 MCG/ML
PLASTIC BAG, INJECTION (ML) INTRAVENOUS
Status: DISPENSED
Start: 2024-04-30

## (undated) RX ORDER — KETOROLAC TROMETHAMINE 30 MG/ML
INJECTION, SOLUTION INTRAMUSCULAR; INTRAVENOUS
Status: DISPENSED
Start: 2024-04-30

## (undated) RX ORDER — KETOROLAC TROMETHAMINE 15 MG/ML
INJECTION, SOLUTION INTRAMUSCULAR; INTRAVENOUS
Status: DISPENSED
Start: 2024-04-30

## (undated) RX ORDER — FENTANYL CITRATE 50 UG/ML
INJECTION, SOLUTION INTRAMUSCULAR; INTRAVENOUS
Status: DISPENSED
Start: 2024-04-30

## (undated) RX ORDER — CEFOXITIN 2 G/1
INJECTION, POWDER, FOR SOLUTION INTRAVENOUS
Status: DISPENSED
Start: 2024-04-30